# Patient Record
Sex: FEMALE | Race: WHITE | NOT HISPANIC OR LATINO | Employment: OTHER | URBAN - METROPOLITAN AREA
[De-identification: names, ages, dates, MRNs, and addresses within clinical notes are randomized per-mention and may not be internally consistent; named-entity substitution may affect disease eponyms.]

---

## 2021-09-22 ENCOUNTER — HOSPITAL ENCOUNTER (EMERGENCY)
Facility: HOSPITAL | Age: 21
Discharge: HOME | End: 2021-09-22
Attending: EMERGENCY MEDICINE | Admitting: EMERGENCY MEDICINE
Payer: COMMERCIAL

## 2021-09-22 VITALS
HEIGHT: 63 IN | SYSTOLIC BLOOD PRESSURE: 122 MMHG | WEIGHT: 128 LBS | HEART RATE: 106 BPM | DIASTOLIC BLOOD PRESSURE: 73 MMHG | RESPIRATION RATE: 18 BRPM | OXYGEN SATURATION: 100 % | TEMPERATURE: 98.2 F | BODY MASS INDEX: 22.68 KG/M2

## 2021-09-22 DIAGNOSIS — J21.0 RSV (ACUTE BRONCHIOLITIS DUE TO RESPIRATORY SYNCYTIAL VIRUS): Primary | ICD-10-CM

## 2021-09-22 DIAGNOSIS — Z34.91 FIRST TRIMESTER PREGNANCY: ICD-10-CM

## 2021-09-22 LAB
ALBUMIN SERPL-MCNC: 4.2 G/DL (ref 3.4–5)
ALP SERPL-CCNC: 48 IU/L (ref 35–126)
ALT SERPL-CCNC: 22 IU/L (ref 11–54)
ANION GAP SERPL CALC-SCNC: 12 MEQ/L (ref 3–15)
AST SERPL-CCNC: 21 IU/L (ref 15–41)
BASOPHILS # BLD: 0.02 K/UL (ref 0.01–0.1)
BASOPHILS NFR BLD: 0.3 %
BILIRUB SERPL-MCNC: 0.2 MG/DL (ref 0.3–1.2)
BUN SERPL-MCNC: 9 MG/DL (ref 8–20)
CALCIUM SERPL-MCNC: 9.8 MG/DL (ref 8.9–10.3)
CHLORIDE SERPL-SCNC: 104 MEQ/L (ref 98–109)
CO2 SERPL-SCNC: 18 MEQ/L (ref 22–32)
CREAT SERPL-MCNC: 0.6 MG/DL (ref 0.6–1.1)
D DIMER PPP IA.FEU-MCNC: 0.4 UG/ML FEU (ref 0–0.5)
DIFFERENTIAL METHOD BLD: NORMAL
EOSINOPHIL # BLD: 0.04 K/UL (ref 0.04–0.36)
EOSINOPHIL NFR BLD: 0.6 %
ERYTHROCYTE [DISTWIDTH] IN BLOOD BY AUTOMATED COUNT: 13.2 % (ref 11.7–14.4)
FLUAV RNA SPEC QL NAA+PROBE: NEGATIVE
FLUBV RNA SPEC QL NAA+PROBE: NEGATIVE
GFR SERPL CREATININE-BSD FRML MDRD: >60 ML/MIN/1.73M*2
GLUCOSE SERPL-MCNC: 91 MG/DL (ref 70–99)
HCG SERPL-ACNC: ABNORMAL IU/L (MIU/ML)
HCG UR QL: POSITIVE
HCT VFR BLDCO AUTO: 37.4 % (ref 35–45)
HGB BLD-MCNC: 12.3 G/DL (ref 11.8–15.7)
IMM GRANULOCYTES # BLD AUTO: 0.02 K/UL (ref 0–0.08)
IMM GRANULOCYTES NFR BLD AUTO: 0.3 %
LYMPHOCYTES # BLD: 1.52 K/UL (ref 1.2–3.5)
LYMPHOCYTES NFR BLD: 22.6 %
MCH RBC QN AUTO: 29.4 PG (ref 28–33.2)
MCHC RBC AUTO-ENTMCNC: 32.9 G/DL (ref 32.2–35.5)
MCV RBC AUTO: 89.3 FL (ref 83–98)
MONOCYTES # BLD: 0.42 K/UL (ref 0.28–0.8)
MONOCYTES NFR BLD: 6.2 %
NEUTROPHILS # BLD: 4.72 K/UL (ref 1.7–7)
NEUTS SEG NFR BLD: 70 %
NRBC BLD-RTO: 0 %
PDW BLD AUTO: 11 FL (ref 9.4–12.3)
PLATELET # BLD AUTO: 266 K/UL (ref 150–369)
POTASSIUM SERPL-SCNC: 3.4 MEQ/L (ref 3.6–5.1)
PROT SERPL-MCNC: 7.7 G/DL (ref 6–8.2)
RBC # BLD AUTO: 4.19 M/UL (ref 3.93–5.22)
RSV RNA SPEC QL NAA+PROBE: POSITIVE
SARS-COV-2 RNA RESP QL NAA+PROBE: NEGATIVE
SODIUM SERPL-SCNC: 134 MEQ/L (ref 136–144)
TROPONIN I SERPL-MCNC: <0.02 NG/ML
WBC # BLD AUTO: 6.74 K/UL (ref 3.8–10.5)

## 2021-09-22 PROCEDURE — 84703 CHORIONIC GONADOTROPIN ASSAY: CPT

## 2021-09-22 PROCEDURE — 25800000 HC PHARMACY IV SOLUTIONS: Performed by: EMERGENCY MEDICINE

## 2021-09-22 PROCEDURE — 93005 ELECTROCARDIOGRAM TRACING: CPT | Performed by: EMERGENCY MEDICINE

## 2021-09-22 PROCEDURE — 36415 COLL VENOUS BLD VENIPUNCTURE: CPT

## 2021-09-22 PROCEDURE — U0003 INFECTIOUS AGENT DETECTION BY NUCLEIC ACID (DNA OR RNA); SEVERE ACUTE RESPIRATORY SYNDROME CORONAVIRUS 2 (SARS-COV-2) (CORONAVIRUS DISEASE [COVID-19]), AMPLIFIED PROBE TECHNIQUE, MAKING USE OF HIGH THROUGHPUT TECHNOLOGIES AS DESCRIBED BY CMS-2020-01-R: HCPCS | Performed by: STUDENT IN AN ORGANIZED HEALTH CARE EDUCATION/TRAINING PROGRAM

## 2021-09-22 PROCEDURE — 84702 CHORIONIC GONADOTROPIN TEST: CPT | Performed by: EMERGENCY MEDICINE

## 2021-09-22 PROCEDURE — 84703 CHORIONIC GONADOTROPIN ASSAY: CPT | Performed by: EMERGENCY MEDICINE

## 2021-09-22 PROCEDURE — 96360 HYDRATION IV INFUSION INIT: CPT

## 2021-09-22 PROCEDURE — 80053 COMPREHEN METABOLIC PANEL: CPT

## 2021-09-22 PROCEDURE — 93005 ELECTROCARDIOGRAM TRACING: CPT

## 2021-09-22 PROCEDURE — 85379 FIBRIN DEGRADATION QUANT: CPT

## 2021-09-22 PROCEDURE — 85025 COMPLETE CBC W/AUTO DIFF WBC: CPT | Performed by: EMERGENCY MEDICINE

## 2021-09-22 PROCEDURE — 84484 ASSAY OF TROPONIN QUANT: CPT | Performed by: EMERGENCY MEDICINE

## 2021-09-22 PROCEDURE — 25000000 HC PHARMACY GENERAL: Performed by: EMERGENCY MEDICINE

## 2021-09-22 PROCEDURE — 87631 RESP VIRUS 3-5 TARGETS: CPT | Performed by: EMERGENCY MEDICINE

## 2021-09-22 PROCEDURE — 99284 EMERGENCY DEPT VISIT MOD MDM: CPT | Mod: 25

## 2021-09-22 PROCEDURE — 80053 COMPREHEN METABOLIC PANEL: CPT | Performed by: EMERGENCY MEDICINE

## 2021-09-22 PROCEDURE — 3E0337Z INTRODUCTION OF ELECTROLYTIC AND WATER BALANCE SUBSTANCE INTO PERIPHERAL VEIN, PERCUTANEOUS APPROACH: ICD-10-PCS | Performed by: EMERGENCY MEDICINE

## 2021-09-22 PROCEDURE — 85379 FIBRIN DEGRADATION QUANT: CPT | Performed by: EMERGENCY MEDICINE

## 2021-09-22 PROCEDURE — 85025 COMPLETE CBC W/AUTO DIFF WBC: CPT

## 2021-09-22 PROCEDURE — 84484 ASSAY OF TROPONIN QUANT: CPT

## 2021-09-22 RX ORDER — ALBUTEROL SULFATE 90 UG/1
2 INHALANT RESPIRATORY (INHALATION)
Status: ON HOLD | COMMUNITY
Start: 2021-04-21 | End: 2024-08-12 | Stop reason: HOSPADM

## 2021-09-22 RX ORDER — MONTELUKAST SODIUM 10 MG/1
10 TABLET ORAL NIGHTLY
COMMUNITY
Start: 2021-03-12 | End: 2023-10-30

## 2021-09-22 RX ORDER — CETIRIZINE HYDROCHLORIDE 10 MG/1
10 TABLET ORAL DAILY
COMMUNITY
End: 2023-10-30

## 2021-09-22 RX ORDER — IPRATROPIUM BROMIDE 21 UG/1
2 SPRAY, METERED NASAL
COMMUNITY
Start: 2021-09-08 | End: 2023-10-30

## 2021-09-22 RX ORDER — IPRATROPIUM BROMIDE AND ALBUTEROL SULFATE 2.5; .5 MG/3ML; MG/3ML
3 SOLUTION RESPIRATORY (INHALATION) ONCE
Status: COMPLETED | OUTPATIENT
Start: 2021-09-22 | End: 2021-09-22

## 2021-09-22 RX ADMIN — SODIUM CHLORIDE 1000 ML: 9 INJECTION, SOLUTION INTRAVENOUS at 21:06

## 2021-09-22 RX ADMIN — IPRATROPIUM BROMIDE AND ALBUTEROL SULFATE 3 ML: 2.5; .5 SOLUTION RESPIRATORY (INHALATION) at 21:13

## 2021-09-22 ASSESSMENT — ENCOUNTER SYMPTOMS
ARTHRALGIAS: 0
NAUSEA: 0
COUGH: 1
SINUS PRESSURE: 1
SORE THROAT: 0
CHILLS: 0
DYSURIA: 0
FEVER: 0
HEMATURIA: 0
VOMITING: 0
COLOR CHANGE: 0
BACK PAIN: 0
PALPITATIONS: 0
ABDOMINAL PAIN: 0
SHORTNESS OF BREATH: 1

## 2021-09-23 LAB
ATRIAL RATE: 117
P AXIS: 65
PR INTERVAL: 114
QRS DURATION: 64
QT INTERVAL: 312
QTC CALCULATION(BAZETT): 435
R AXIS: 74
T WAVE AXIS: 24
VENTRICULAR RATE: 117

## 2021-09-23 NOTE — ED ATTESTATION NOTE
9/22/202110:28 PM  I have personally seen and examined the patient.  I reviewed and agree with the PA/NP/Resident's assessment and plan of care.         Kane Nava, DO  09/22/21 2229

## 2021-09-23 NOTE — ED PROVIDER NOTES
Emergency Medicine Note  HPI   HISTORY OF PRESENT ILLNESS     21-year-old female with significant PMH of asthma who is 7 weeks pregnant presents to the ED today complaining of worsening cough and shortness of breath x1 week.  Patient reports that she initially thought that she was sick with some congestion and sinus discomfort.  Currently now has a dry cough.  Notes significant history of sinus problems (the with prior sinus surgery) that always seems to exacerbate her asthma.  Today she did her albuterol inhaler multiple times and 2 nebulizer treatments without relief of her shortness of breath and ultimately presented to the ED.  She is not vaccinated against COVID-19.  She denies chest pain or pleuritic discomfort.  Denies productive cough, fever/chills, abdominal pain, pelvic pain, vaginal bleeding, nausea vomiting, diarrhea or urinary symptoms.    She has a first appointment with [facility] at the end of the month.            Patient History   PAST HISTORY     Reviewed from Nursing Triage:      Past Medical History:   Diagnosis Date   • Asthma        Past Surgical History:   Procedure Laterality Date   • NOSE SURGERY         History reviewed. No pertinent family history.    Social History     Tobacco Use   • Smoking status: Never Smoker   • Smokeless tobacco: Never Used   Substance Use Topics   • Alcohol use: Never   • Drug use: Never         Review of Systems   REVIEW OF SYSTEMS     Review of Systems   Constitutional: Negative for chills and fever.   HENT: Positive for congestion and sinus pressure. Negative for ear pain and sore throat.    Respiratory: Positive for cough and shortness of breath.    Cardiovascular: Negative for chest pain and palpitations.   Gastrointestinal: Negative for abdominal pain, nausea and vomiting.   Genitourinary: Negative for dysuria, hematuria, pelvic pain and vaginal bleeding.   Musculoskeletal: Negative for arthralgias and back pain.   Skin: Negative for color change and rash.    All other systems reviewed and are negative.        VITALS     ED Vitals    Date/Time Temp Pulse Resp BP SpO2 [facility]   09/22/21 2226 36.8 °C (98.2 °F) 106 18 122/73 100 % KLM   09/22/21 2114 36.7 °C (98.1 °F) 102 20 136/81 98 % KLM   09/22/21 2009 -- 125 26 127/86 99 % MJB   09/22/21 1839 36.9 °C (98.4 °F) 138 32 153/81 100 % SJD        Pulse Ox %: 100 % (09/22/21 2300)  Pulse Ox Interpretation: Normal (09/22/21 2300)  Heart Rate: 117 (09/22/21 2300)  Rhythm Strip Interpretation: Sinus Tachycardia (09/22/21 2300)     Physical Exam   PHYSICAL EXAM     Physical Exam  Vitals and nursing note reviewed.   Constitutional:       General: She is not in acute distress.     Appearance: She is well-developed.      Comments: Well-appearing; NAD   HENT:      Head: Normocephalic and atraumatic.   Eyes:      Conjunctiva/sclera: Conjunctivae normal.   Cardiovascular:      Rate and Rhythm: Regular rhythm. Tachycardia present.      Heart sounds: No murmur heard.      Pulmonary:      Effort: Pulmonary effort is normal. No respiratory distress.      Breath sounds: Normal breath sounds.      Comments: Tachypneic  CTA bilaterally without wheezes, rales, rhonchi  SPO2 100% on RA  Abdominal:      Palpations: Abdomen is soft.      Tenderness: There is no abdominal tenderness.   Musculoskeletal:      Cervical back: Neck supple.   Skin:     General: Skin is warm and dry.   Neurological:      General: No focal deficit present.      Mental Status: She is alert and oriented to person, place, and time.           PROCEDURES     Procedures     DATA     Results     Procedure Component Value Units Date/Time    [facility] Draw Panel [[id number]] Collected: 09/22/21 1849    Specimen: Blood, Venous Updated: 09/23/21 0301    Narrative:      The following orders were created for panel order [facility] Draw Panel.  Procedure                               Abnormality         Status                     ---------                               -----------          ------                     RAINBOW RED[[id number]]                                      Final result                 Please view results for these tests on the individual orders.    RAINBOW RED [[id number]] Collected: 09/22/21 1849    Specimen: Blood, Venous Updated: 09/23/21 0301    BhCG, Serum, Quant [[id number]]  (Abnormal) Collected: 09/22/21 1849    Specimen: Blood, Venous Updated: 09/22/21 2216     hCG Quant 115,106.0 IU/L (mIU/mL)      Comment: Approx. Gestational Age   Approx. hCG Range         (Weeks)                  (IU/L)          0.2-1                    5-50            1-2                               2-3                  100-5000            3-4                  500-10,000            4-5                 1000-50,000            5-6               10,000-100,000            6-8               15,000-200,000            8-12              10,000-100,000       RSV and Influenza Nucleic Acids, PCR Nasopharynx [[id number]]  (Abnormal) Collected: 09/22/21 2010    Specimen: Nasopharyngeal Swab from Nasopharynx Updated: 09/22/21 2132     Influenza A Negative     Influenza B Negative     Respiratory Syncytial Virus Positive    SARS-CoV-2 (COVID-19), PCR Nasopharynx [[id number]]  (Normal) Collected: 09/22/21 2010    Specimen: Nasopharyngeal Swab from Nasopharynx Updated: 09/22/21 2102    Narrative:      The following orders were created for panel order SARS-CoV-2 (COVID-19), PCR Nasopharynx.  Procedure                               Abnormality         Status                     ---------                               -----------         ------                     SARS-CoV-2 (COVID-19), P...[[id number]]  Normal              Final result                 Please view results for these tests on the individual orders.    SARS-CoV-2 (COVID-19), PCR Nasopharynx [[id number]]  (Normal) Collected: 09/22/21 2010    Specimen: Nasopharyngeal Swab from Nasopharynx Updated: 09/22/21 2102     SARS-CoV-2 (COVID-19) Negative    Troponin  I [[id number]]  (Normal) Collected: 09/22/21 1849    Specimen: Blood, Venous Updated: 09/22/21 1927     Troponin I <0.02 ng/mL     D-DIMER [[id number]]  (Normal) Collected: 09/22/21 1849    Specimen: Blood, Venous Updated: 09/22/21 1924     D-Dimer, Quant 0.40 ug/mL FEU      Comment: The D-Dimer assay can be used as an aid in the diagnosis of DVT or PE. The test can not be used by itself to exclude DVT or PE. When used as a diagnostic aid, the cutoff value is the same as the reference range: <0.5 ug/ml FEU.       Comprehensive metabolic panel [[id number]]  (Abnormal) Collected: 09/22/21 1849    Specimen: Blood, Venous Updated: 09/22/21 1921     Sodium 134 mEQ/L      Potassium 3.4 mEQ/L      Comment: Results obtained on plasma. Plasma Potassium values may be up to 0.4 mEQ/L less than serum values. The differences may be greater for patients with high platelet or white cell counts.        Chloride 104 mEQ/L      CO2 18 mEQ/L      BUN 9 mg/dL      Creatinine 0.6 mg/dL      Glucose 91 mg/dL      Calcium 9.8 mg/dL      AST (SGOT) 21 IU/L      ALT (SGPT) 22 IU/L      Alkaline Phosphatase 48 IU/L      Total Protein 7.7 g/dL      Comment: Test performed on plasma which typically contains approximately 0.4 g/dL more protein than serum.        Albumin 4.2 g/dL      Bilirubin, Total 0.2 mg/dL      eGFR >60.0 mL/min/1.73m*2      Anion Gap 12 mEQ/L     hCG, serum, qualitative [[id number]]  (Abnormal) Collected: 09/22/21 1849    Specimen: Blood, Venous Updated: 09/22/21 1921     Preg Test, Serum Positive    Narrative:      CALLED TO [facility] BY NL AT 1920      CBC and differential [[id number]] Collected: 09/22/21 1849    Specimen: Blood, Venous Updated: 09/22/21 1907     WBC 6.74 K/uL      RBC 4.19 M/uL      Hemoglobin 12.3 g/dL      Hematocrit 37.4 %      MCV 89.3 fL      MCH 29.4 pg      MCHC 32.9 g/dL      RDW 13.2 %      Platelets 266 K/uL      MPV 11.0 fL      Differential Type Auto     nRBC 0.0 %      Immature Granulocytes  0.3 %      Neutrophils 70.0 %      Lymphocytes 22.6 %      Monocytes 6.2 %      Eosinophils 0.6 %      Basophils 0.3 %      Immature Granulocytes, Absolute 0.02 K/uL      Neutrophils, Absolute 4.72 K/uL      Lymphocytes, Absolute 1.52 K/uL      Monocytes, Absolute 0.42 K/uL      Eosinophils, Absolute 0.04 K/uL      Basophils, Absolute 0.02 K/uL           Imaging Results    None         ECG 12 lead   Final Result          Scoring tools                                 ED Course & MDM   MDM / ED COURSE and CLINICAL IMPRESSIONS     MDM    ED Course as of 09/25/21 0804   Wed Sep 22, 2021   2009 Impression: SOB x 1 week; sudden icrease today. Hx asthma attacks. Inhalers/breathing treatments at home without relief.  [BB]   2010 WBC: 6.74 [BB]   2010 Preg Test, Serum(!): Positive [BB]   2010 D-Dimer, Quant: 0.40 [BB]   2010 Troponin I: <0.02 [BB]   2010 Sodium(!): 134 [BB]   2023 RN aware of where patient is and IVF need [BB]   2024 Pt declining CXR. Discussed dimer, WBC, and so02 100% in detail with her and family bedside [BB]   2036 SARS-CoV-2 (COVID-19): Negative [BB]   2115 [facility] bedside to evaluate patient; in agreement with POC [BB]   2300 Respiratory Syncytial Virus(!!): Positive [BB]   2303 Patient feels significant improvement after DuoNeb and IV fluids.  Revitalized with improvement and decrease HR.  I discussed her diagnosis with her and symptomatic plan of care.  Discussed that it is still contagious and she should limit contact as viruses can last for 10 to 14 days and she verbalized understanding.  She feels much better after treatment here.  We will continue with her at home neb and inhalers.  Lots of fluids. No ABX needed at this time.  Discussed red flags return to ED following up with her PCP and she verbalized understanding with her family bedside. [BB]      ED Course User Index  [BB] Piedad Anderson PA C         Clinical Impressions as of 09/25/21 0804   RSV (acute bronchiolitis due to respiratory  syncytial virus)   First trimester pregnancy            Piedad Anderson PA C  09/25/21 0804

## 2021-09-23 NOTE — DISCHARGE INSTRUCTIONS
You were treated in the emergency department today for cough and upper respiratory infection.  Your blood work in the ER that was negative for things like a blood clot, any stress or injury to your heart, or pneumonia in your lungs that needed an antibiotic.    Your test for RSV; a common viral lung infection was positive.  This is a viral infection that typically requires symptomatic treatment of over-the-counter cough syrup, inhalers or breathing treatments as needed with her history of asthma, lots of fluids and symptomatic care.    Your test for Covid and the flu were negative.    Your oxygen is normal in the ER.  You can continue using your inhaler and breathing treatments at home as directed.  Hydrate with plenty of oral fluids and electrolyte enriched things such as Pedialyte and/or Gatorade.    Follow-up with your PCP 2 to 3 days to think about your visit to the ER today to discuss your feeling.    Follow-up with your OB/GYN for your first meal appointment as scheduled.    Return to the ER for any new or worsening symptoms.

## 2021-10-04 LAB
D AG BLD QL: POSITIVE
EXTERNAL ABO: NORMAL
EXTERNAL ANTIBODY SCREEN: NORMAL
HBV SURFACE AG SER QL: NONREACTIVE
HIV 1+2 AB+HIV1 P24 AG SERPL QL IA: NONREACTIVE
RUBELLA IGG SCREEN: NORMAL
T PALLIDUM AB SER QL IF: NONREACTIVE

## 2021-10-05 ENCOUNTER — TRANSCRIBE ORDERS (OUTPATIENT)
Dept: SCHEDULING | Age: 21
End: 2021-10-05
Payer: COMMERCIAL

## 2021-10-05 DIAGNOSIS — O35.10X0 MATERNAL CARE FOR (SUSPECTED) CHROMOSOMAL ABNORMALITY IN FETUS, NOT APPLICABLE OR UNSPECIFIED: ICD-10-CM

## 2021-10-05 DIAGNOSIS — O26.841 UTERINE SIZE-DATE DISCREPANCY, FIRST TRIMESTER: ICD-10-CM

## 2021-10-05 DIAGNOSIS — Z36.87 ENCOUNTER FOR ANTENATAL SCREENING FOR UNCERTAIN DATES: Primary | ICD-10-CM

## 2021-10-05 DIAGNOSIS — Z36.82 ENCOUNTER FOR ANTENATAL SCREENING FOR NUCHAL TRANSLUCENCY: ICD-10-CM

## 2021-10-05 DIAGNOSIS — Z36.9 ENCOUNTER FOR ANTENATAL SCREENING OF MOTHER: ICD-10-CM

## 2021-10-07 ENCOUNTER — TRANSCRIBE ORDERS (OUTPATIENT)
Dept: SCHEDULING | Age: 21
End: 2021-10-07
Payer: COMMERCIAL

## 2021-10-07 DIAGNOSIS — O36.80X0 PREGNANCY WITH INCONCLUSIVE FETAL VIABILITY, NOT APPLICABLE OR UNSPECIFIED: Primary | ICD-10-CM

## 2021-10-18 ENCOUNTER — OFFICE VISIT (OUTPATIENT)
Dept: FAMILY MEDICINE | Facility: CLINIC | Age: 21
End: 2021-10-18
Payer: COMMERCIAL

## 2021-10-18 VITALS
OXYGEN SATURATION: 99 % | TEMPERATURE: 94.4 F | BODY MASS INDEX: 23.32 KG/M2 | SYSTOLIC BLOOD PRESSURE: 116 MMHG | RESPIRATION RATE: 19 BRPM | WEIGHT: 131.6 LBS | DIASTOLIC BLOOD PRESSURE: 64 MMHG | HEIGHT: 63 IN | HEART RATE: 100 BPM

## 2021-10-18 DIAGNOSIS — Z00.00 ANNUAL PHYSICAL EXAM: Primary | ICD-10-CM

## 2021-10-18 DIAGNOSIS — Z23 FLU VACCINE NEED: ICD-10-CM

## 2021-10-18 DIAGNOSIS — Z34.01 PRIMIGRAVIDA IN FIRST TRIMESTER: ICD-10-CM

## 2021-10-18 DIAGNOSIS — J45.40 MODERATE PERSISTENT ASTHMA WITHOUT COMPLICATION: ICD-10-CM

## 2021-10-18 PROCEDURE — 99385 PREV VISIT NEW AGE 18-39: CPT | Mod: GC,25 | Performed by: FAMILY MEDICINE

## 2021-10-18 PROCEDURE — 90686 IIV4 VACC NO PRSV 0.5 ML IM: CPT | Performed by: FAMILY MEDICINE

## 2021-10-18 PROCEDURE — 90471 IMMUNIZATION ADMIN: CPT | Performed by: FAMILY MEDICINE

## 2021-10-18 RX ORDER — BUDESONIDE AND FORMOTEROL FUMARATE DIHYDRATE 160; 4.5 UG/1; UG/1
2 AEROSOL RESPIRATORY (INHALATION) 2 TIMES DAILY
COMMUNITY
End: 2023-10-30

## 2021-10-18 ASSESSMENT — PATIENT HEALTH QUESTIONNAIRE - PHQ9: SUM OF ALL RESPONSES TO PHQ9 QUESTIONS 1 & 2: 0

## 2021-10-18 NOTE — PROGRESS NOTES
Subjective      Patient ID: Miryam Irby is a 21 y.o. female here for the following:    Establish Care.  Patient with no current health complaints    Asthma  Follows with ENT, Dr. Ruddy Craig for allergies and asthma.   Experienced a temporary increase in symptoms after discontinuing dupixent when she became pregnant. Symptoms have returned to baseline, feels that asthma is well controlled.  Has not needed to use albuterol while exercising.    Hospitalized due to asthma as a child.  No recent hospitalizations.      Pregnancy  Dr. Bk Vázquez for OBGYN in [place].  Pregnancy has been uncomplicated.  Due in May. No morning sickness. Is taking a prenatal vitamin and Vitamin D 1000 mcg.    Has experienced increase in constipation since becoming pregnant.  Takes a probiotic.  Eats a lot of fruits and vegetables. Occasionally takes Miralax. Has bowel movement twice a week.  No blood in stool.      Recently completed nursing school.     No tobacco use  Alcohol use: Prior to pregnancy had several drinks a month. No current alcohol use  No other substance use.     She is interested in getting the flu vaccine today. Has not received covid vaccine.         Health Maintenance Questions  STI Concern No   Feel Safe at Home Yes, lives with boyfriend   Diet Breakfast: Eggs and fruit, Lunch: salad or avocado toast, Dinner:  chicken, salmon with veggies and a grain.  Eats out 2-3 x a week   Exercise Treadmill, spinning, body weights   # Days/Week Moderate-Greater Intensity 5   Minutes spent at that activity level 45-60   Seat Belts Yes   Sunscreen Yes   Smoke/CO Detectors Yes   Guns in the home No     Screenings  Colonoscopy/Fit n/a   Mammogram n/a   PAP 2020   DEXA n/a   Lung Ca Screening  (55-78 y/o, current smoker or quit < 15 years ago, > 30 pack/years) n/a   HCV (Born [birth year]) No results found for: HEPCAB    Dentist 07/2021, Every 6 months   Eye Exam Once a year, 06/2021. Wears glasses for driving  "      Influenza Due   Tdap 06/2020   Shingrix/Zostavax n/a   Pneumovax n/a   Prevnar n/a          PMH   Patient Active Problem List   Diagnosis   • Moderate persistent asthma without complication     Past Surgical History:   Procedure Laterality Date   • NOSE SURGERY         Current Outpatient Medications:   •  albuterol HFA (PROAIR HFA) 90 mcg/actuation inhaler, Inhale 2 puffs., Disp: , Rfl:   •  budesonide-formoteroL 160-4.5 mcg/actuation inhaler, Inhale 2 puffs 2 (two) times a day.  Rinse mouth with water after use to reduce aftertaste and incidence of candidiasis.  Do not swallow. For patients not on a ventilator, a spacer is recommended to be used with this medication/inhaler., Disp: , Rfl:   •  cetirizine (ZyrTEC) 10 mg tablet, Take 10 mg by mouth daily., Disp: , Rfl:   •  montelukast (SINGULAIR) 10 mg tablet, Take 10 mg by mouth nightly., Disp: , Rfl:   •  ipratropium (ATROVENT) 21 mcg (0.03 %) nasal spray, Administer 2 sprays into affected nostril(s)., Disp: , Rfl:   No Known Allergies  Family History   Problem Relation Age of Onset   • Asthma Biological Mother    • Hypertension Biological Mother    • Heart disease Maternal Grandfather    • Depression Paternal Grandmother    • Depression Paternal Grandfather           The following have been reviewed and updated as appropriate in this visit:         Review of Systems:  No fevers, chills, nausea/vomiting/diarrhea.  No abdominal pain.  No chest pain.  No shortness of breath.  No neurologic symptoms.  No focal weakness. No fatigue. No heat/cold intolerance.  Constipation.     Objective   Vitals:    10/18/21 1317   BP: 116/64   BP Location: Right upper arm   Patient Position: Sitting   Pulse: 100   Resp: 19   Temp: (!) 34.7 °C (94.4 °F)   TempSrc: Temporal   SpO2: 99%   Weight: 59.7 kg (131 lb 9.6 oz)   Height: 1.6 m (5' 3\")     Body mass index is 23.31 kg/m².    Physical Exam  Vitals reviewed.   Constitutional:       Appearance: Normal appearance.   HENT:      " Head: Normocephalic and atraumatic.      Right Ear: Tympanic membrane, ear canal and external ear normal.      Left Ear: Tympanic membrane, ear canal and external ear normal.   Eyes:      Conjunctiva/sclera: Conjunctivae normal.   Neck:      Thyroid: No thyroid mass, thyromegaly or thyroid tenderness.   Cardiovascular:      Rate and Rhythm: Normal rate and regular rhythm.      Heart sounds: Normal heart sounds. No murmur heard.  No friction rub. No gallop.    Pulmonary:      Effort: Pulmonary effort is normal.      Breath sounds: Normal breath sounds.   Abdominal:      General: Abdomen is flat. There is no distension.   Musculoskeletal:      Right lower leg: No edema.      Left lower leg: No edema.   Lymphadenopathy:      Cervical: No cervical adenopathy.   Skin:     General: Skin is warm and dry.   Neurological:      General: No focal deficit present.      Mental Status: She is alert and oriented to person, place, and time.      Gait: Gait normal.   Psychiatric:         Mood and Affect: Mood normal.         Behavior: Behavior normal.         Assessment/Plan   Diagnoses and all orders for this visit:    Annual physical exam (Primary)  Comments:  New patient to establish care.  Past, surgical, and family history reviewed. Medications reviewed.  Up to date on screenings. Counseled on benefits of covid vaccine.      Flu vaccine need  -     Influenza vaccine quadrivalent preservative free 6 mon and older IM (FluLaval)    Moderate persistent asthma without complication  Comments:  Patient follows with ENT.  Currently well controlled on current meds. Advised to follow up if asthma symptoms do not remain well controlled.      Primigravida in first trimester  Comments:  Follows with obgyn at [place]. Taking prenatal vitamins. Recommended to start magnesium for constipation that has worsened since pregnancy started.           Jacqueline Torres MD  10/18/21  3:27 PM

## 2021-10-18 NOTE — PROGRESS NOTES
I saw the patient and discussed the management with the Resident. I reviewed the Resident's note and agree with the documented findings and plan of care, except for my comments below or within the additional notes today.  New pt here for wellness exam. She is 11-12 weeks pregnant (seeing ObGyn for mgmt). Due for flu shot. Plans to get COVID19 vaccine later in pregnancy.  Pt has asthma and is taking Symbicort 2 puffs BID. Other screening UTD. Chronic constipation: MiraLax has worked in the past but pt does not like taking this regularly.  Exam: alert, vitals normal  Heart/lungs:med student heard mild wheezing. Resident did not  Agree with continuing asthma meds and Allergist/Asthma eval if any worsening symptoms.  Flu shot today  Trial of Magnesium 250-500mg for constipation. MiraLax prn

## 2021-10-18 NOTE — PATIENT INSTRUCTIONS
Drink plenty of water to improve constipation.  Use miralax as needed.  Start taking magnesium supplement every night.  You can start at 250 mg and increase as needed.      Plan on getting covid vaccine.    Patient Education   VACCINE INFORMATION STATEMENT    Influenza (Flu) Vaccine (Inactivated or Recombinant): What you need to know    Many vaccine information statements are available in [place] and other languages.   See www.immunize.org/vis    Hojas de información sobre vacunas están disponibles en español y en muchos otros   idiomas. Visite www.immunize.org/vis    1. Why get vaccinated?  Influenza vaccine can prevent influenza (flu).  Flu is a contagious disease that spreads around the [place] every year, usually between October and May. Anyone can get the flu, but it is more dangerous for some people. Infants and young children, people 65 years and older, pregnant people,   and people with certain health conditions or a weakened immune system are at greatest risk of flu complications.  Pneumonia, bronchitis, sinus infections, and ear infections are examples of flu-related complications. If you have a medical condition, such as heart disease, cancer, or diabetes, flu can make it worse.Flu can cause fever and chills, sore throat, muscle   aches, fatigue, cough, headache, and runny or stuffy nose. Some people may have vomiting and diarrhea, though this is more common in children than adults.In an average year, thousands of people in the [place] die from flu, and many more are   hospitalized. Flu vaccine prevents millions of illnesses and flu-related visits to the doctor each year.    2. Influenza vaccines  CDC recommends everyone 6 months and older get vaccinated every flu season. Children 6 months through 8 years of age may need 2 doses during a single flu season. Everyone else needs only 1 dose each flu season. It takes about 2 weeks for protection to develop after vaccination.There are many flu  viruses, and they are always changing. Each year a new flu vaccine is made to protect against the influenza viruses believed to be likely to cause disease in the upcoming flu season. Even when the vaccine doesn’t exactly match these viruses, it may still provide some protection.    Influenza vaccine does not cause flu.    Influenza vaccine may be given at the same time as other vaccines.    3. Talk with your health care provider  Tell your vaccination provider if the person getting the vaccine:  · Has had an allergic reaction after a previous dose of influenza vaccine, or has any severe, lifethreatening allergies  · Has ever had Guillain-Barré Syndrome (also called “GBS”)  In some cases, your health care provider may decide to postpone influenza vaccination until a future visit.  Influenza vaccine can be administered at any   time during pregnancy. People who are or will be   pregnant during influenza season should receive   inactivated influenza vaccine.    People with minor illnesses, such as a cold, may be vaccinated. People who are moderately or severely ill should usually wait until they recover before getting   influenza vaccine.  Your health care provider can give you more information.  4. Risks of a vaccine reaction  · Soreness, redness, and swelling where the shot is given, fever, muscle aches, and headache can happen after influenza vaccination.  · There may be a very small increased risk of Guillain-Barré Syndrome (GBS) after inactivated influenza vaccine (the flu shot).  Young children who get the flu shot along with pneumococcal vaccine (PCV13) and/or DTaP vaccine at the same time might be slightly more likely to have a seizure caused by fever. Tell your health care provider if a child who is getting flu vaccine has ever had a seizure.  People sometimes faint after medical procedures, including vaccination. Tell your provider if you feel dizzy or have vision changes or ringing in the ears.As with any  medicine, there is a very remote chance of a vaccine causing a severe allergic reaction, other serious injury, or death.    5. What if there is a serious problem?  An allergic reaction could occur after the vaccinated person leaves the clinic. If you see signs of a severe allergic reaction (hives, swelling of the face and throat, difficulty breathing, a fast heartbeat, dizziness, or weakness), call 9-1-1 and get the person   to the nearest hospital.  For other signs that concern you, call your health care provider.  Adverse reactions should be reported to the Vaccine Adverse Event Reporting System (VAERS). Your health care provider will usually file this report, or   you can do it yourself. Visit the VAERS website at www.vaers.hhs.gov or call 1-496.940.3325. VAERS is only for reporting reactions, and VAERS staff   members do not give medical advice.    6. The National Vaccine Injury Compensation Program  The National Vaccine Injury Compensation Program (VICP) is a federal program that was created to compensate people who may have been injured by certain vaccines. Claims regarding alleged injury or death due to vaccination have a time limit for filing,   which may be as short as two years. Visit the VICP website at www.hrsa.gov/vaccinecompensation or call 1-477.393.6892 to learn about the program and about filing a claim.    7. How can I learn more?  · ‚Ask your health care provider.  · Call your local or state health department.  · ‚Visit the website of the Food and Drug Administration (FDA) for vaccine package   inserts and additional information at www.fda.gov/vaccines-blood-biologics/vaccines.  · ‚Contact the Centers for Disease Control and Prevention (CDC):  -Call 1-234.318.2416 (7-299-AYO-INFO) or  -Visit CDC’s website at www.cdc.gov/flu    Vaccine Information Statement Inactivated Influenza Vaccine  42 U.S.C. § 300aa-26 8/6/2021

## 2021-10-19 ENCOUNTER — HOSPITAL ENCOUNTER (OUTPATIENT)
Dept: RADIOLOGY | Age: 21
Discharge: HOME | End: 2021-10-19
Attending: OBSTETRICS & GYNECOLOGY
Payer: COMMERCIAL

## 2021-10-19 DIAGNOSIS — O36.80X0 PREGNANCY WITH INCONCLUSIVE FETAL VIABILITY, NOT APPLICABLE OR UNSPECIFIED: ICD-10-CM

## 2021-10-19 PROCEDURE — 76801 OB US < 14 WKS SINGLE FETUS: CPT

## 2021-10-25 ENCOUNTER — HOSPITAL ENCOUNTER (OUTPATIENT)
Dept: PERINATAL CARE | Facility: HOSPITAL | Age: 21
Discharge: HOME | End: 2021-10-25
Attending: OBSTETRICS & GYNECOLOGY
Payer: COMMERCIAL

## 2021-10-25 ENCOUNTER — APPOINTMENT (OUTPATIENT)
Dept: LAB | Facility: HOSPITAL | Age: 21
End: 2021-10-25
Attending: OBSTETRICS & GYNECOLOGY
Payer: COMMERCIAL

## 2021-10-25 VITALS — BODY MASS INDEX: 23.04 KG/M2 | WEIGHT: 130 LBS | HEIGHT: 63 IN

## 2021-10-25 DIAGNOSIS — Z36.82 ENCOUNTER FOR ANTENATAL SCREENING FOR NUCHAL TRANSLUCENCY: Primary | ICD-10-CM

## 2021-10-25 DIAGNOSIS — Z3A.12 12 WEEKS GESTATION OF PREGNANCY: ICD-10-CM

## 2021-10-25 DIAGNOSIS — O26.841 UTERINE SIZE-DATE DISCREPANCY, FIRST TRIMESTER: ICD-10-CM

## 2021-10-25 DIAGNOSIS — Z36.3 ANTENATAL SCREENING FOR MALFORMATION USING ULTRASONICS: ICD-10-CM

## 2021-10-25 DIAGNOSIS — Z36.82 ENCOUNTER FOR ANTENATAL SCREENING FOR NUCHAL TRANSLUCENCY: ICD-10-CM

## 2021-10-25 PROCEDURE — 76813 OB US NUCHAL MEAS 1 GEST: CPT

## 2021-10-25 PROCEDURE — 30099997 SPECIMEN PROCESSING

## 2021-10-26 ENCOUNTER — TRANSCRIBE ORDERS (OUTPATIENT)
Dept: SCHEDULING | Age: 21
End: 2021-10-26
Payer: COMMERCIAL

## 2021-10-26 DIAGNOSIS — Z36.3 ENCOUNTER FOR ANTENATAL SCREENING FOR MALFORMATIONS: Primary | ICD-10-CM

## 2021-12-20 ENCOUNTER — HOSPITAL ENCOUNTER (OUTPATIENT)
Dept: PERINATAL CARE | Facility: HOSPITAL | Age: 21
Discharge: HOME | End: 2021-12-20
Attending: STUDENT IN AN ORGANIZED HEALTH CARE EDUCATION/TRAINING PROGRAM
Payer: COMMERCIAL

## 2021-12-20 DIAGNOSIS — O35.9XX0 SUSPECTED FETAL ABNORMALITY AFFECTING MANAGEMENT OF MOTHER, ANTEPARTUM, SINGLE OR UNSPECIFIED FETUS: Primary | ICD-10-CM

## 2021-12-20 DIAGNOSIS — Z3A.20 20 WEEKS GESTATION OF PREGNANCY: ICD-10-CM

## 2021-12-20 DIAGNOSIS — Z36.86 ENCOUNTER FOR SCREENING FOR RISK OF PRE-TERM LABOR: ICD-10-CM

## 2021-12-20 DIAGNOSIS — Z36.3 ENCOUNTER FOR ANTENATAL SCREENING FOR MALFORMATIONS: ICD-10-CM

## 2021-12-20 PROCEDURE — 76817 TRANSVAGINAL US OBSTETRIC: CPT

## 2022-02-15 ENCOUNTER — TELEPHONE (OUTPATIENT)
Dept: FAMILY MEDICINE | Facility: CLINIC | Age: 22
End: 2022-02-15
Payer: COMMERCIAL

## 2022-02-15 NOTE — TELEPHONE ENCOUNTER
I have never heard of this type of protocol, I think the patient should get further clarification form her benefits person.   Unnecessary telemedicine visit is probably to switch medication to a lower tier preferred medication, without even listening to the patient's breathing.

## 2022-02-15 NOTE — TELEPHONE ENCOUNTER
Incoming call on nurse line from patient.  Her insurance now has a new pharmacy, a medical specialty pharmacy, to dispense her symbicort.  This pharmacy is requesting that a copy of her last office visit with her medications be faxed to them at [phone].  She is required to meet with her insurance company doctor once so that the pharmacy change can occur.      I called patient's previous pharmacy, [facility], to be certain the information provided by the patient is correct in order to protect her PHI. They stated it is correct that patient can no longer obtain her symbicort through [facility].  She must now use [facility].  Spoke with patient once more who states that [facility].  I obtained [facility] phone number for [facility], [phone].  Spoke with pharmacy Blank peters, at [facility] who confirmed that patient must see [facility] specialty doctor via telemedicine to receive her symbicort which they consider a specialty medication.  It is correct that they would like her last visit notes faxed to [phone] so that she can be called to schedule a telemedicine appointment in order to continue taking her symbicort.    Notes from 10/18/2021 visit will be faxed to [phone] so that patient can continue to receive her medication.

## 2022-02-16 NOTE — TELEPHONE ENCOUNTER
Reviewed.  Will discuss with patient again today and advise Benefits clarification.    Detailed active my chart message sent to patient.

## 2022-04-16 LAB — GP B STREP SPEC QL CULT: NO GROWTH

## 2022-04-30 ENCOUNTER — ANESTHESIA (OUTPATIENT)
Dept: OBSTETRICS AND GYNECOLOGY | Facility: HOSPITAL | Age: 22
End: 2022-04-30
Payer: COMMERCIAL

## 2022-04-30 ENCOUNTER — HOSPITAL ENCOUNTER (INPATIENT)
Facility: HOSPITAL | Age: 22
LOS: 2 days | Discharge: HOME | End: 2022-05-02
Attending: OBSTETRICS & GYNECOLOGY | Admitting: OBSTETRICS & GYNECOLOGY
Payer: COMMERCIAL

## 2022-04-30 ENCOUNTER — ANESTHESIA EVENT (OUTPATIENT)
Dept: OBSTETRICS AND GYNECOLOGY | Facility: HOSPITAL | Age: 22
End: 2022-04-30
Payer: COMMERCIAL

## 2022-04-30 LAB
ABO + RH BLD: NORMAL
BLD GP AB SCN SERPL QL: NEGATIVE
D AG BLD QL: POSITIVE
ERYTHROCYTE [DISTWIDTH] IN BLOOD BY AUTOMATED COUNT: 13.9 % (ref 11.7–14.4)
HCT VFR BLDCO AUTO: 34.3 % (ref 35–45)
HGB BLD-MCNC: 11.4 G/DL (ref 11.8–15.7)
LABORATORY COMMENT REPORT: NORMAL
MCH RBC QN AUTO: 30.6 PG (ref 28–33.2)
MCHC RBC AUTO-ENTMCNC: 33.2 G/DL (ref 32.2–35.5)
MCV RBC AUTO: 92 FL (ref 83–98)
PDW BLD AUTO: 10.5 FL (ref 9.4–12.3)
PLATELET # BLD AUTO: 232 K/UL (ref 150–369)
RBC # BLD AUTO: 3.73 M/UL (ref 3.93–5.22)
SARS-COV-2 RNA RESP QL NAA+PROBE: NEGATIVE
SPECIMEN EXP DATE BLD: NORMAL
WBC # BLD AUTO: 8.6 K/UL (ref 3.8–10.5)

## 2022-04-30 PROCEDURE — 63700000 HC SELF-ADMINISTRABLE DRUG: Performed by: OBSTETRICS & GYNECOLOGY

## 2022-04-30 PROCEDURE — 86780 TREPONEMA PALLIDUM: CPT | Performed by: OBSTETRICS & GYNECOLOGY

## 2022-04-30 PROCEDURE — U0003 INFECTIOUS AGENT DETECTION BY NUCLEIC ACID (DNA OR RNA); SEVERE ACUTE RESPIRATORY SYNDROME CORONAVIRUS 2 (SARS-COV-2) (CORONAVIRUS DISEASE [COVID-19]), AMPLIFIED PROBE TECHNIQUE, MAKING USE OF HIGH THROUGHPUT TECHNOLOGIES AS DESCRIBED BY CMS-2020-01-R: HCPCS | Performed by: OBSTETRICS & GYNECOLOGY

## 2022-04-30 PROCEDURE — 37000005 HC ANESTHESIA EPIDURAL/SPINAL

## 2022-04-30 PROCEDURE — 25000000 HC PHARMACY GENERAL: Performed by: ANESTHESIOLOGY

## 2022-04-30 PROCEDURE — 86850 RBC ANTIBODY SCREEN: CPT

## 2022-04-30 PROCEDURE — 0UQMXZZ REPAIR VULVA, EXTERNAL APPROACH: ICD-10-PCS | Performed by: OBSTETRICS & GYNECOLOGY

## 2022-04-30 PROCEDURE — 0KQM0ZZ REPAIR PERINEUM MUSCLE, OPEN APPROACH: ICD-10-PCS | Performed by: OBSTETRICS & GYNECOLOGY

## 2022-04-30 PROCEDURE — 12000000 HC ROOM AND CARE MED/SURG

## 2022-04-30 PROCEDURE — 85027 COMPLETE CBC AUTOMATED: CPT | Performed by: OBSTETRICS & GYNECOLOGY

## 2022-04-30 PROCEDURE — 63600000 HC DRUGS/DETAIL CODE

## 2022-04-30 PROCEDURE — 36415 COLL VENOUS BLD VENIPUNCTURE: CPT | Performed by: OBSTETRICS & GYNECOLOGY

## 2022-04-30 PROCEDURE — 86900 BLOOD TYPING SEROLOGIC ABO: CPT

## 2022-04-30 PROCEDURE — 25800000 HC PHARMACY IV SOLUTIONS

## 2022-04-30 PROCEDURE — 63600000 HC DRUGS/DETAIL CODE: Performed by: OBSTETRICS & GYNECOLOGY

## 2022-04-30 PROCEDURE — 63600000 HC DRUGS/DETAIL CODE: Performed by: ANESTHESIOLOGY

## 2022-04-30 PROCEDURE — 27200130 HC EPIDURAL ANES TRAY

## 2022-04-30 PROCEDURE — 72000011 HC VAGINAL DELIVERY LEVEL 1

## 2022-04-30 PROCEDURE — 25800000 HC PHARMACY IV SOLUTIONS: Performed by: ANESTHESIOLOGY

## 2022-04-30 RX ORDER — AMOXICILLIN 250 MG
1 CAPSULE ORAL 2 TIMES DAILY
Status: DISCONTINUED | OUTPATIENT
Start: 2022-04-30 | End: 2022-05-02 | Stop reason: HOSPADM

## 2022-04-30 RX ORDER — SODIUM CHLORIDE, SODIUM LACTATE, POTASSIUM CHLORIDE, CALCIUM CHLORIDE 600; 310; 30; 20 MG/100ML; MG/100ML; MG/100ML; MG/100ML
INJECTION, SOLUTION INTRAVENOUS CONTINUOUS
Status: DISCONTINUED | OUTPATIENT
Start: 2022-04-30 | End: 2022-05-02 | Stop reason: HOSPADM

## 2022-04-30 RX ORDER — DIBUCAINE 1 %
1 OINTMENT (GRAM) TOPICAL AS NEEDED
Status: DISCONTINUED | OUTPATIENT
Start: 2022-04-30 | End: 2022-05-02 | Stop reason: HOSPADM

## 2022-04-30 RX ORDER — OXYCODONE HYDROCHLORIDE 5 MG/1
5 TABLET ORAL EVERY 4 HOURS PRN
Status: DISCONTINUED | OUTPATIENT
Start: 2022-04-30 | End: 2022-05-02 | Stop reason: HOSPADM

## 2022-04-30 RX ORDER — ALUMINUM HYDROXIDE, MAGNESIUM HYDROXIDE, AND SIMETHICONE 1200; 120; 1200 MG/30ML; MG/30ML; MG/30ML
30 SUSPENSION ORAL EVERY 4 HOURS PRN
Status: DISCONTINUED | OUTPATIENT
Start: 2022-04-30 | End: 2022-05-02 | Stop reason: HOSPADM

## 2022-04-30 RX ORDER — EPHEDRINE SULFATE/0.9% NACL/PF 50 MG/5 ML
SYRINGE (ML) INTRAVENOUS
Status: DISPENSED
Start: 2022-04-30 | End: 2022-04-30

## 2022-04-30 RX ORDER — FENTANYL/ROPIVACAINE/NS/PF 2-1500 MCG
PREFILLED PUMP RESERVOIR INJECTION CONTINUOUS
Status: DISCONTINUED | OUTPATIENT
Start: 2022-04-30 | End: 2022-05-02 | Stop reason: HOSPADM

## 2022-04-30 RX ORDER — EPHEDRINE SULFATE/0.9% NACL/PF 50 MG/5 ML
10 SYRINGE (ML) INTRAVENOUS ONCE
Status: ACTIVE | OUTPATIENT
Start: 2022-04-30 | End: 2022-04-30

## 2022-04-30 RX ORDER — IBUPROFEN 600 MG/1
600 TABLET ORAL EVERY 6 HOURS
Status: DISCONTINUED | OUTPATIENT
Start: 2022-04-30 | End: 2022-05-02 | Stop reason: HOSPADM

## 2022-04-30 RX ORDER — CALCIUM CARBONATE 200(500)MG
500 TABLET,CHEWABLE ORAL EVERY 4 HOURS PRN
Status: DISCONTINUED | OUTPATIENT
Start: 2022-04-30 | End: 2022-05-02 | Stop reason: HOSPADM

## 2022-04-30 RX ORDER — LIDOCAINE HYDROCHLORIDE AND EPINEPHRINE 15; 5 MG/ML; UG/ML
INJECTION, SOLUTION EPIDURAL
Status: COMPLETED | OUTPATIENT
Start: 2022-04-30 | End: 2022-04-30

## 2022-04-30 RX ORDER — FENTANYL/ROPIVACAINE/NS/PF 2-1500 MCG
PREFILLED PUMP RESERVOIR INJECTION
Status: COMPLETED
Start: 2022-04-30 | End: 2022-04-30

## 2022-04-30 RX ORDER — NYSTATIN 100000 [USP'U]/G
1 OINTMENT TOPICAL AS NEEDED
Status: DISCONTINUED | OUTPATIENT
Start: 2022-04-30 | End: 2022-05-02 | Stop reason: HOSPADM

## 2022-04-30 RX ORDER — ACETAMINOPHEN 325 MG/1
650 TABLET ORAL EVERY 4 HOURS PRN
Status: DISCONTINUED | OUTPATIENT
Start: 2022-04-30 | End: 2022-05-02 | Stop reason: HOSPADM

## 2022-04-30 RX ADMIN — PRENATAL VITAMINS-IRON FUMARATE 27 MG IRON-FOLIC ACID 0.8 MG TABLET 1 TABLET: at 17:57

## 2022-04-30 RX ADMIN — SODIUM CHLORIDE, POTASSIUM CHLORIDE, SODIUM LACTATE AND CALCIUM CHLORIDE: 600; 310; 30; 20 INJECTION, SOLUTION INTRAVENOUS at 10:13

## 2022-04-30 RX ADMIN — Medication: at 19:27

## 2022-04-30 RX ADMIN — SENNOSIDES AND DOCUSATE SODIUM 1 TABLET: 50; 8.6 TABLET ORAL at 19:23

## 2022-04-30 RX ADMIN — IBUPROFEN 600 MG: 600 TABLET ORAL at 17:57

## 2022-04-30 RX ADMIN — FENTANYL CITRATE 50 ML: 0.05 INJECTION, SOLUTION INTRAMUSCULAR; INTRAVENOUS at 13:08

## 2022-04-30 RX ADMIN — ACETAMINOPHEN 650 MG: 325 TABLET ORAL at 19:23

## 2022-04-30 RX ADMIN — SODIUM CHLORIDE, POTASSIUM CHLORIDE, SODIUM LACTATE AND CALCIUM CHLORIDE: 600; 310; 30; 20 INJECTION, SOLUTION INTRAVENOUS at 09:46

## 2022-04-30 RX ADMIN — FENTANYL CITRATE 50 ML: 0.05 INJECTION, SOLUTION INTRAMUSCULAR; INTRAVENOUS at 09:42

## 2022-04-30 RX ADMIN — LIDOCAINE HYDROCHLORIDE,EPINEPHRINE BITARTRATE 5 ML: 15; .005 INJECTION, SOLUTION EPIDURAL; INFILTRATION; INTRACAUDAL; PERINEURAL at 10:05

## 2022-04-30 ASSESSMENT — COGNITIVE AND FUNCTIONAL STATUS - GENERAL: DO YOU HAVE SERIOUS DIFFICULTY WALKING OR CLIMBING STAIRS: NO

## 2022-04-30 NOTE — H&P
HPI     Miryam Irby is a 21 y.o. female  at 39w0d with an estimated due date of 2022, by early Us presents with regular contractions in active labor. An epidural was placed.  The patient then progressed to complete and is now pushing making excellent progress.   Her pregnancy was uncomplicated.    Last PO intake:  Last Food Intake Date: 22, Last Food Intake Time: 2330  Last Fluid Intake Date: 22, Last Fluid Intake Time: 0945    OB History:   OB History    Para Term  AB Living   1 0 0 0 0 0   SAB IAB Ectopic Multiple Live Births   0 0 0 0 0      # Outcome Date GA Lbr Isreal/2nd Weight Sex Delivery Anes PTL Lv   1 Current                Medical History:   Past Medical History:   Diagnosis Date   • Asthma    • History of tonsillectomy        Surgical History:   Past Surgical History:   Procedure Laterality Date   • NOSE SURGERY     • SINUS SURGERY         Social History:   Social History     Socioeconomic History   • Marital status: Single     Spouse name: None   • Number of children: None   • Years of education: None   • Highest education level: None   Tobacco Use   • Smoking status: Never Smoker   • Smokeless tobacco: Never Used   Vaping Use   • Vaping Use: Never used   Substance and Sexual Activity   • Alcohol use: Never   • Drug use: Never   • Sexual activity: Yes     Partners: Male        Family History:   Family History   Problem Relation Age of Onset   • Asthma Biological Mother    • Hypertension Biological Mother    • Heart disease Maternal Grandfather    • Depression Paternal Grandmother    • Depression Paternal Grandfather        Allergies: Patient has no known allergies.    Prior to Admission medications    Medication Sig Start Date End Date Taking? Authorizing Provider   prenatal vit no.130-iron-folic 27 mg iron- 800 mcg tablet tablet Take 1 tablet by mouth daily.   Yes Provider, MD Anali   albuterol HFA (VENTOLIN HFA) 90 mcg/actuation inhaler Inhale 2 puffs.  4/21/21   Anali Arroyo MD   budesonide-formoteroL 160-4.5 mcg/actuation inhaler Inhale 2 puffs 2 (two) times a day.   Rinse mouth with water after use to reduce aftertaste and incidence of candidiasis.   Do not swallow.  For patients not on a ventilator, a spacer is recommended to be used with this medication/inhaler.    Anali Arroyo MD   cetirizine (ZyrTEC) 10 mg tablet Take 10 mg by mouth daily.    Anali Arroyo MD   ipratropium (ATROVENT) 21 mcg (0.03 %) nasal spray Administer 2 sprays into affected nostril(s). 9/8/21 10/8/21  Anali Arroyo MD   montelukast (SINGULAIR) 10 mg tablet Take 10 mg by mouth nightly. 3/12/21   Anali Arroyo MD       Review of Systems  Pertinent items are noted in HPI.    Objective     Vital Signs for the last 24 hours:  Temp:  [36.4 °C (97.6 °F)-36.9 °C (98.4 °F)] 36.7 °C (98.1 °F)  Heart Rate:  [] 67  Resp:  [16-18] 16  BP: ()/(56-87) 115/59    Latest cervical exam:  Cervical Dilation (cm): 10  Cervical Effacement: 100  Fetal Station: +2  Method: sterile exam per RN (04/30/22 1503)      Fetal Monitoring:  FHR Baseline: 120  FHR Variability: with pushing   FHR Accelerations: present   FHR Decelerations: variables    Contraction Frequency: every 1.5 minutes     Exam:  General Appearance: Alert, cooperative, no acute distress  Abdomen: gravid, nontender  Genitalia: See vaginal exam  Extremities: no edema or calf tenderness  Neurologic: grossly intact without focal deficits    Ultrasounds:   I have reviewed the applicable Ultrasounds.      Labs:  ABO   Date Value Ref Range Status   04/30/2022 A  Final     Rh Factor   Date Value Ref Range Status   04/30/2022 Positive  Final     Rubella IgG Scr   Date Value Ref Range Status   10/04/2021 Immune  Final     Strep Gp B Cult/DNA Probe   Date Value Ref Range Status   04/16/2022 No growth See table below, No growth at 18-24 hours, Probable contaminants, suggest recollection, No growth at 48  hours, No growth at 72 hours, No growth at 96 hours, No growth at 120 hours, No growth at 1 week, No growth at 2 weeks, No growth at 3 weeks, No gr... Final       Assessment/Plan     Miryam Irby is a 21 y.o. female  at 39w0d admitted for labor management.     FHR: Category I  GBS: negative    Mili Solitario DO

## 2022-04-30 NOTE — ANESTHESIA PREPROCEDURE EVALUATION
Anesthesia ROS/MED HX    Anesthesia History - neg  Pulmonary    asthma  Neuro/Psych - neg  Cardiovascular- neg  Hematological - neg  GI/Hepatic- neg  Musculoskeletal- neg  Renal Disease- neg  Endo/Other- neg      Relevant Problems   RESPIRATORY SYSTEM   (+) Moderate persistent asthma without complication       Physical Exam    Airway   Mallampati: I   TM distance: <3 FB   Neck ROM: full  Cardiovascular - normal   Rhythm: regular   Rate: normalPulmonary - normal   clear to auscultation  Other Findings   Back - neg   landmarks identified    Dental - normal        Anesthesia Plan    Plan: epidural    Technique: epidural     Lines and Monitors: PIV     Airway: natural airway / supplemental oxygen   ASA 2  Anesthetic plan and risks discussed with: patient  Postop Plan:   Patient Disposition: inpatient floor planned admission   Pain Management: IV analgesics

## 2022-04-30 NOTE — L&D DELIVERY NOTE
OB Vaginal Delivery Note    Delivery:2022 at 3:47 PM   Patient:Miryam Irby  :2000    Review the Delivery Report for details.     Delivery Details    Pre-Op Diagnosis: 1. 21 y.o.  intrauterine pregnancy at 39w0d with knutson gestation.  2. Labor    Post-Op Diagnosis: 1. Delivered viable male   Delivery Clinician: Mili Solitario    Delivery Assist;Delivery Nurse  Soni Kent;Yesica Clancy    Delivery Type: Vaginal, Spontaneous    Labor Complications: None    EBL: 250  mL   Anesthesia Type: Epidural    Placenta Delivery Spontaneous    Placenta Disposition: discarded    Additional Specimens None      INFANT INFORMATION  Time of Birth:3:47 PM   Presentation: Vertex   Position:Right ,Occiput ,Anterior   Cord: 3 vessels ,Complications:None   Henderson Sex: male   Henderson Weight:       1 Minute 5 Minute 10 Minute   Apgar Totals: 8   9          Information for the patient's :  [place] [[id number]]      Cord Gas     None           Delivery Details:    Miryam Irby is a 21 y.o.  at 39w0d gestation who presented to the hospital for PREGNANCY.  Her labor was spontaneous.  The patient progressed to fully dilated and pushed for  hours and   minutes.  A viable  male  infant was delivered by Vaginal, Spontaneous  from Right ,Occiput ,Anterior .  The anterior and posterior shoulders delivered without difficulty followed by the remainder of the infant. A spontaneous cry was heard, and the infant appeared to be moving all 4 extremities. The cord was clamped and cut with 3 vessels  noted.  The placenta delivered spontaneously shortly thereafter.  Fundal massage was performed and the fundus was found to be firm, and lochia was within normal limits. The perineum, vagina, cervix were inspected, and the following lacerations were noted:      Episiotomy: Median    Lacerations:   Perineal: 2nd  Repaired: Yes     Periurethral: right   Repaired: Yes    Labial:    Repaired:     Sulcus:   Repaired:     Vaginal:   Repaired:     Cervical:    Repaired:        Any lacerations were repaired in the usual fashion using Chromic;4-0 Synthetic Suture  suture. Excellent hemostasis was noted. At the completion of the case, sponge and needle counts were correct. The infant and patient were left in the delivery room in stable condition.     Attending Attestation: I was present and scrubbed for the entire procedure.    Mili Solitario DO

## 2022-04-30 NOTE — ANESTHESIA PROCEDURE NOTES
Epidural Block    Patient location during procedure: OB  Start time: 4/30/2022 9:54 AM  End time: 4/30/2022 10:05 AM  Reason for block: labor analgesia requested by patient and obstetrician  Staffing  Performed: anesthesiologist   Anesthesiologist: Steven Taylor MD  Preanesthetic Checklist  Completed: patient identified, surgical consent, pre-op evaluation, timeout performed, IV checked, risks and benefits discussed, monitors and equipment checked and sterile field maintained during procedure  Needle  Needle type: Tuohy   Needle gauge: 17 G  Needle length: 3.5 in  Catheter type: Single orifice  Catheter size: 19 G  Test dose: negative and lidocaine 1.5% with epinephrine 1-to-200,000  Assessment  Sensory level: T10  Additional Notes  Procedure well tolerated. Vital signs stable. No paresthesia.  Analgesia satisfactory. Patients nurse to notify anesthesiologist if hemodynamically unstable.    Medications Administered - 4/30/2022 10:05 AM   lidocaine 1.5%-EPINEPHrine 1:200,000 PF (XYLOCAINE W/EPI) injection - epidural   5 mL - 4/30/2022 10:05:00 AM

## 2022-04-30 NOTE — ANESTHESIOLOGIST PRE-PROCEDURE ATTESTATION
Pre-Procedure Patient Identification:  I am the Primary Anesthesiologist and have identified the patient on 04/30/22 at 10:29 AM.   I have confirmed the procedure(s) will be performed by the following surgeon/proceduralist * No surgeons listed *.

## 2022-05-01 PROBLEM — Z37.9 NORMAL LABOR: Status: ACTIVE | Noted: 2022-05-01

## 2022-05-01 LAB — T PALLIDUM AB SER QL IF: NONREACTIVE

## 2022-05-01 PROCEDURE — 63700000 HC SELF-ADMINISTRABLE DRUG: Performed by: OBSTETRICS & GYNECOLOGY

## 2022-05-01 PROCEDURE — 12000000 HC ROOM AND CARE MED/SURG

## 2022-05-01 RX ORDER — IBUPROFEN 600 MG/1
600 TABLET ORAL EVERY 6 HOURS
Qty: 40 TABLET | Refills: 0 | Status: SHIPPED | OUTPATIENT
Start: 2022-05-01 | End: 2023-10-30

## 2022-05-01 RX ORDER — ACETAMINOPHEN 325 MG/1
650 TABLET ORAL EVERY 4 HOURS PRN
Refills: 0
Start: 2022-05-01 | End: 2022-05-31

## 2022-05-01 RX ADMIN — IBUPROFEN 600 MG: 600 TABLET ORAL at 21:20

## 2022-05-01 RX ADMIN — SENNOSIDES AND DOCUSATE SODIUM 1 TABLET: 50; 8.6 TABLET ORAL at 08:55

## 2022-05-01 RX ADMIN — ACETAMINOPHEN 650 MG: 325 TABLET ORAL at 20:50

## 2022-05-01 RX ADMIN — SENNOSIDES AND DOCUSATE SODIUM 1 TABLET: 50; 8.6 TABLET ORAL at 20:49

## 2022-05-01 RX ADMIN — ACETAMINOPHEN 650 MG: 325 TABLET ORAL at 00:07

## 2022-05-01 RX ADMIN — MUPIROCIN 1 APPLICATION.: 20 OINTMENT TOPICAL at 16:02

## 2022-05-01 RX ADMIN — ACETAMINOPHEN 650 MG: 325 TABLET ORAL at 05:25

## 2022-05-01 RX ADMIN — OXYCODONE HYDROCHLORIDE 5 MG: 5 TABLET ORAL at 05:23

## 2022-05-01 RX ADMIN — IBUPROFEN 600 MG: 600 TABLET ORAL at 00:07

## 2022-05-01 RX ADMIN — IBUPROFEN 600 MG: 600 TABLET ORAL at 08:56

## 2022-05-01 RX ADMIN — PRENATAL VITAMINS-IRON FUMARATE 27 MG IRON-FOLIC ACID 0.8 MG TABLET 1 TABLET: at 08:56

## 2022-05-01 RX ADMIN — IBUPROFEN 600 MG: 600 TABLET ORAL at 15:47

## 2022-05-01 NOTE — DISCHARGE SUMMARY
Inpatient Discharge Summary    BRIEF OVERVIEW  Admitting Provider: Mili Solitario DO  Discharge Provider: Mili Solitario DO  Primary Care Physician at Discharge: Jacqueline Torres [phone]     Admission Date: 4/30/2022     Discharge Date: 5/2/2022    Primary Discharge Diagnosis  Normal labor    Secondary Discharge Diagnosis  none    Discharge Disposition  Home     Discharge Medications     Medication List      START taking these medications    acetaminophen 325 mg tablet  Commonly known as: TYLENOL  Take 2 tablets (650 mg total) by mouth every 4 (four) hours as needed for mild pain.  Dose: 650 mg     ibuprofen 600 mg tablet  Commonly known as: MOTRIN  Take 1 tablet (600 mg total) by mouth every 6 (six) hours for 10 days.  Dose: 600 mg        CONTINUE taking these medications    albuterol HFA 90 mcg/actuation inhaler  Commonly known as: VENTOLIN HFA  Inhale 2 puffs.  Dose: 2 puff     budesonide-formoteroL 160-4.5 mcg/actuation inhaler  Commonly known as: SYMBICORT  Inhale 2 puffs 2 (two) times a day.   Rinse mouth with water after use to reduce aftertaste and incidence of candidiasis.   Do not swallow.  For patients not on a ventilator, a spacer is recommended to be used with this medication/inhaler.  Dose: 2 puff     cetirizine 10 mg tablet  Commonly known as: ZyrTEC  Take 10 mg by mouth daily.  Dose: 10 mg     ipratropium 21 mcg (0.03 %) nasal spray  Commonly known as: ATROVENT  Administer 2 sprays into affected nostril(s).  Dose: 2 spray     montelukast 10 mg tablet  Commonly known as: SINGULAIR  Take 10 mg by mouth nightly.  Dose: 10 mg     prenatal vit no.130-iron-folic 27 mg iron- 800 mcg tablet tablet  Take 1 tablet by mouth daily.  Dose: 1 tablet            Active Issues Requiring Follow-up  Issue: none  Responsible Individual: self  What is Needed: 6 weeks  Follow-up Appointments Arranged: No     Outpatient Follow-Up  Encounter Information    This patient does not currently have any  appointments scheduled.         Test Results Pending at Discharge      DETAILS OF HOSPITAL STAY    Presenting Problem/History of Present Illness  Normal labor [O80, Z37.9]      Hospital Course/Operative Procedures Performed    Consults: none  Procedures: None  Pertinent Test Results:   CBC Results       04/30/22 09/22/21     0940 1849    WBC 8.60 6.74    RBC 3.73 4.19    HGB 11.4 12.3    HCT 34.3 37.4    MCV 92.0 89.3    MCH 30.6 29.4    MCHC 33.2 32.9     266

## 2022-05-01 NOTE — ANESTHESIA POSTPROCEDURE EVALUATION
Patient: Miryam Irby    Procedure Summary     Date: 04/30/22 Room / Location:     Anesthesia Start: 0953 Anesthesia Stop: 1547    Procedure: Labor Analgesia Diagnosis:     Scheduled Providers:  Responsible Provider: Steven Taylor MD    Anesthesia Type: epidural ASA Status: 2          Anesthesia Type: epidural  PACU Vitals    No data found in the last 10 encounters.           Anesthesia Post Evaluation    Pain management: adequate  Patient location during evaluation: floor  Patient participation: complete - patient participated  Level of consciousness: awake and alert  Cardiovascular status: acceptable  Airway Patency: adequate  Respiratory status: acceptable  Hydration status: acceptable  Pain well controlled after spinal preservative free morphine administration: Yes  Anesthetic complications: no

## 2022-05-01 NOTE — PLAN OF CARE
Problem: Adult Inpatient Plan of Care  Goal: Plan of Care Review  Outcome: Progressing  Flowsheets (Taken 5/1/2022 1651)  Progress: improving  Plan of Care Reviewed With:   patient   significant other  Outcome Summary: VSS. pain well-controlled w/ PO meds. increasingly independent at self and infant care. able to latch infant onto breast independently. social work consult pending prior to d/c to home tomorrow (for assistance w/ CHIP for infant).   Plan of Care Review  Plan of Care Reviewed With: patient, significant other  Progress: improving  Outcome Summary: VSS. pain well-controlled w/ PO meds. increasingly independent at self and infant care. able to latch infant onto breast independently. social work consult pending prior to d/c to home tomorrow (for assistance w/ CHIP for infant).

## 2022-05-01 NOTE — NURSING NOTE
PT up to bathroom and tape stuck to bed and pulled epidural tape. Tape removed and still waiting for anesthesia for caherter removal.     2230 catheter accidentally tugged by PT, called to the room and epidural catheter was backed out of PT. Black tip intact, anesthesia notified and assessed PT.

## 2022-05-01 NOTE — PROGRESS NOTES
Obstetrics Postpartum Progress Note    Events  No acute events overnight.    Subjective  Pain: no  Bleeding: lochia minimal  Diet: taking regular diet  Voiding: without difficulty  Ambulating: as tolerated    Vitals  Temp:  [36.4 °C (97.6 °F)-37.1 °C (98.8 °F)] 36.4 °C (97.6 °F)  Heart Rate:  [] 87  Resp:  [16-18] 16  BP: ()/() 116/72    I&O    Intake/Output Summary (Last 24 hours) at 2022 0928  Last data filed at 2022 1800  Gross per 24 hour   Intake 1034.62 ml   Output 800 ml   Net 234.62 ml       Physical Exam  General: well  Heart: Regular rate and rhythm  Lungs: Clear to auscultation bilaterally  Abdomen: soft, nondistended, non-tender  Fundus: firm and below umbilicus  Perineum: well approximated  Extremities: symmetric    Labs  Labs Reviewed:  Lab Results   Component Value Date    ABO A 2022    LABRH Positive 2022          Assessment/Plan   Problem-based Assessment and Plan    Miryammilla Irby is a 21 y.o.  postpartum day 1 s/p Vaginal, Spontaneous .    1. Vital Signs: stable  2. Hemodynamics: stable  3. Pain: controlled  4. Vaccinations/Rhogam: rhogam not indicated   5. Postpartum care: meeting all goals     Mili Solitario DO

## 2022-05-01 NOTE — PLAN OF CARE
Problem: Adjustment to Role Transition (Postpartum Vaginal Delivery)  Goal: Successful Maternal Role Transition  Outcome: Progressing     Problem: Bleeding (Postpartum Vaginal Delivery)  Goal: Hemostasis  Outcome: Progressing     Problem: Infection (Postpartum Vaginal Delivery)  Goal: Absence of Infection Signs and Symptoms  Outcome: Progressing     Problem: Pain (Postpartum Vaginal Delivery)  Goal: Acceptable Pain Control  Outcome: Progressing      PT is very active in infant care. Both PT and spouse eager to learn. Bleeding is light, ice to perineum, medication given, voids spontaneously. Resting in bed now.

## 2022-05-01 NOTE — LACTATION NOTE
P1. Assisted with two feedings. Baby latched well with active suckling and audible swallows. Reviewed supply/ demand, benefits of breastfeeding, home pumping tips, latch and positioning tips, feeding frequency, feeding cues,and signs of adequate intake. Lactation team will follow.

## 2022-05-01 NOTE — NURSING NOTE
Call placed to nurse anesthetist for epidural cath removal bc PT uncomfortable. She was in a case but would pass along to anesthesia for removal.

## 2022-05-02 VITALS
SYSTOLIC BLOOD PRESSURE: 111 MMHG | WEIGHT: 161 LBS | OXYGEN SATURATION: 98 % | DIASTOLIC BLOOD PRESSURE: 65 MMHG | HEIGHT: 63 IN | BODY MASS INDEX: 28.53 KG/M2 | HEART RATE: 81 BPM | RESPIRATION RATE: 18 BRPM | TEMPERATURE: 98.7 F

## 2022-05-02 PROCEDURE — 63700000 HC SELF-ADMINISTRABLE DRUG: Performed by: OBSTETRICS & GYNECOLOGY

## 2022-05-02 RX ADMIN — IBUPROFEN 600 MG: 600 TABLET ORAL at 04:44

## 2022-05-02 RX ADMIN — ACETAMINOPHEN 650 MG: 325 TABLET ORAL at 01:30

## 2022-05-02 RX ADMIN — PRENATAL VITAMINS-IRON FUMARATE 27 MG IRON-FOLIC ACID 0.8 MG TABLET 1 TABLET: at 08:42

## 2022-05-02 RX ADMIN — SENNOSIDES AND DOCUSATE SODIUM 1 TABLET: 50; 8.6 TABLET ORAL at 08:42

## 2022-05-02 NOTE — PLAN OF CARE
Problem: Adult Inpatient Plan of Care  Goal: Plan of Care Review  Outcome: Progressing  Flowsheets (Taken 5/1/2022 2340)  Progress: improving  Plan of Care Reviewed With:   patient   significant other  Outcome Summary: VSS, pain well controlled with Motrin/Tyl & occas Abbey, bf independently   Plan of Care Review  Plan of Care Reviewed With: patient, significant other  Progress: improving  Outcome Summary: VSS, pain well controlled with Motrin/Tyl & occas Abbey, bf independently

## 2022-05-02 NOTE — PROGRESS NOTES
5/2/2022  SW spoke to patient, patient states lives with FOB this is their 1st baby. Patient is expected to D/C home later today. Patient states that she is under her mother's insurance but baby needs insurance. SW explained that mother will have to apply for MA Shaina. SW informed that Financial Services will contact mother to complete an application over the phone. SW alerted Financial Services, Valeria Waddell to follow up. SW explained that MA application will take up to 30 days to be processed and approved by local [facility] assistance office.    SW also mentioned that baby will have a pediatrician appointment post discharge and that mother may want to mention to Pediatrician office that insurance is pending, mother may be responsible for co-pay. Mother understood, will contact pediatrician office, Flora Abdi.     Patient is breast feeding, has pump and supplies at home. Patient has supports, doing well very happy. No other needs. Brittnee ESCOBAR

## 2022-08-30 ENCOUNTER — HOSPITAL ENCOUNTER (OUTPATIENT)
Facility: CLINIC | Age: 22
Discharge: HOME | End: 2022-08-30
Attending: FAMILY MEDICINE
Payer: COMMERCIAL

## 2022-08-30 VITALS
DIASTOLIC BLOOD PRESSURE: 74 MMHG | OXYGEN SATURATION: 98 % | HEART RATE: 73 BPM | SYSTOLIC BLOOD PRESSURE: 122 MMHG | TEMPERATURE: 97.9 F

## 2022-08-30 DIAGNOSIS — L03.032 CELLULITIS OF GREAT TOE OF LEFT FOOT: ICD-10-CM

## 2022-08-30 DIAGNOSIS — L03.031 CELLULITIS OF GREAT TOE OF RIGHT FOOT: Primary | ICD-10-CM

## 2022-08-30 PROCEDURE — S9083 URGENT CARE CENTER GLOBAL: HCPCS | Performed by: NURSE PRACTITIONER

## 2022-08-30 PROCEDURE — 99213 OFFICE O/P EST LOW 20 MIN: CPT | Performed by: NURSE PRACTITIONER

## 2022-08-30 RX ORDER — MUPIROCIN 20 MG/G
OINTMENT TOPICAL 2 TIMES DAILY
Qty: 22 G | Refills: 0 | Status: SHIPPED | OUTPATIENT
Start: 2022-08-30 | End: 2022-09-06

## 2022-08-30 RX ORDER — DOXYCYCLINE 100 MG/1
100 CAPSULE ORAL 2 TIMES DAILY
Qty: 14 CAPSULE | Refills: 0 | Status: SHIPPED | OUTPATIENT
Start: 2022-08-30 | End: 2022-09-06

## 2022-08-30 ASSESSMENT — ENCOUNTER SYMPTOMS
CHILLS: 0
WOUND: 1
MYALGIAS: 0
COLOR CHANGE: 1
FATIGUE: 0
FEVER: 0

## 2022-08-30 NOTE — ED ATTESTATION NOTE
The patient was evaluated and managed by the nurse practitioner.  I was present in the office and provided direct supervision.  I have reviewed and agree with the diagnosis and treatment plan.       Susan Cottrell DO  08/30/22 1949

## 2022-08-30 NOTE — ED PROVIDER NOTES
"Emergency Medicine Note  HPI   HISTORY OF PRESENT ILLNESS     23 y/o female presents with concern for right great toe infection.   Got her toe nails done on Sunday night (2 days ago)  States that the technician was \"digging\" and clipping a lot of skin around her nails.  The following morning, had pain  Developed yellow drainage started today.  Cleansed with alcohol and applied Neosporin  Denies fevers, chills, myalgias,   NKDA  LMP 8/28/22 ( 2 days ago)  Just gave birth - not breast feeding              Patient History   PAST HISTORY     Reviewed from Nursing Triage:         Past Medical History:   Diagnosis Date   • Asthma    • History of tonsillectomy        Past Surgical History:   Procedure Laterality Date   • NOSE SURGERY     • SINUS SURGERY         Family History   Problem Relation Age of Onset   • Asthma Biological Mother    • Hypertension Biological Mother    • Heart disease Maternal Grandfather    • Depression Paternal Grandmother    • Depression Paternal Grandfather        Social History     Tobacco Use   • Smoking status: Never Smoker   • Smokeless tobacco: Never Used   Vaping Use   • Vaping Use: Never used   Substance Use Topics   • Alcohol use: Never   • Drug use: Never         Review of Systems   REVIEW OF SYSTEMS     Review of Systems   Constitutional: Negative for chills, fatigue and fever.   Musculoskeletal: Negative for myalgias.   Skin: Positive for color change and wound.         VITALS     ED Vitals    Date/Time Temp Pulse Resp BP SpO2 [facility]   08/30/22 1805 36.6 °C (97.9 °F) 73 -- 122/74 98 % KG                       Physical Exam   PHYSICAL EXAM     Physical Exam  Vitals reviewed.   Constitutional:       General: She is awake. She is not in acute distress.     Appearance: Normal appearance. She is well-developed and well-groomed. She is not ill-appearing, toxic-appearing or diaphoretic.   Cardiovascular:      Rate and Rhythm: Normal rate.   Pulmonary:      Effort: Pulmonary effort is normal. "   Feet:      Comments: Bilateral great toes with erythema and edema surrounding nail bilaterally (right > left).  Scant purulent drainage around nail on right foot  Tender to palpation.   Neurological:      Mental Status: She is alert.   Psychiatric:         Behavior: Behavior is cooperative.           PROCEDURES     Procedures     DATA     Results     None              No orders to display       Scoring tools                                  ED Course & MDM   MDM / ED COURSE / CLINICAL IMPRESSION / DISPO     MDM  Number of Diagnoses or Management Options  Cellulitis of great toe of left foot  Cellulitis of great toe of right foot  Diagnosis management comments:   21 y/o female presents with concern for right great toe infection after getting pedicure  Generally well-appearing in NAD. Vitals are normal. Bilateral great toes with erythema and edema surrounding nail bilaterally (right > left). Scant purulent drainage around nail on right foot. Tender to palpation. No expressible draingae  With treat for cellulitis with Mupirocin and Doxycycline. Warm soaks BID-TID  Strict return precautions given. Patient was given opportunity to ask questions. Patient verbalized understanding and agreeable with plan. See dispo for full care plan.           Clinical Impression      None     Disposition           Iesha Tucker CRNP  08/30/22 1926

## 2022-08-30 NOTE — DISCHARGE INSTRUCTIONS
Perform warm soaks 2-3 times per day.  Pat dry.  Apply topical antibiotic ointment after each soak.  Doxycycline twice per day for 7 days with a full meal and full glass of water to prevent stomach upset.  Do not lay flat for at least 1-2 hours after taking medication.   Medication can cause sensitivity to the sun, so wear sunscreen and protective clothing/hat while outdoors.     Avoid pedicures or picking at the skin around your nails.  Follow-up with your PCP if symptoms do not improve in 48-72 hours - sooner if symptoms worsen.     Thank you for choosing [facility]

## 2023-01-22 ENCOUNTER — HOSPITAL ENCOUNTER (OUTPATIENT)
Facility: CLINIC | Age: 23
Discharge: HOME | End: 2023-01-22
Attending: FAMILY MEDICINE
Payer: COMMERCIAL

## 2023-01-22 VITALS
DIASTOLIC BLOOD PRESSURE: 84 MMHG | HEART RATE: 77 BPM | OXYGEN SATURATION: 96 % | SYSTOLIC BLOOD PRESSURE: 124 MMHG | RESPIRATION RATE: 18 BRPM | HEIGHT: 63 IN | TEMPERATURE: 97.5 F | BODY MASS INDEX: 22.15 KG/M2 | WEIGHT: 125 LBS

## 2023-01-22 DIAGNOSIS — S91.209A NAIL AVULSION OF TOE, INITIAL ENCOUNTER: Primary | ICD-10-CM

## 2023-01-22 PROCEDURE — 99212 OFFICE O/P EST SF 10 MIN: CPT | Performed by: NURSE PRACTITIONER

## 2023-01-22 PROCEDURE — S9083 URGENT CARE CENTER GLOBAL: HCPCS | Performed by: NURSE PRACTITIONER

## 2023-01-22 ASSESSMENT — ENCOUNTER SYMPTOMS: COLOR CHANGE: 1

## 2023-01-22 NOTE — ED PROVIDER NOTES
Emergency Medicine Note  HPI   HISTORY OF PRESENT ILLNESS     Patient presents with recurrent issue with her right  big toe.  She was seen here for toe infection after getting her nails done.  She said ever since now she has issues with what she thinks is fungus in the nail and the nail is 3/4 of the way off.            Patient History   PAST HISTORY     Reviewed from Nursing Triage:       Past Medical History:   Diagnosis Date   • Asthma    • History of tonsillectomy        Past Surgical History:   Procedure Laterality Date   • NOSE SURGERY     • SINUS SURGERY         Family History   Problem Relation Age of Onset   • Asthma Biological Mother    • Hypertension Biological Mother    • Heart disease Maternal Grandfather    • Depression Paternal Grandmother    • Depression Paternal Grandfather        Social History     Tobacco Use   • Smoking status: Never   • Smokeless tobacco: Never   Vaping Use   • Vaping Use: Never used   Substance Use Topics   • Alcohol use: Never   • Drug use: Never         Review of Systems   REVIEW OF SYSTEMS     Review of Systems   Skin: Positive for color change.   All other systems reviewed and are negative.        VITALS     ED Vitals    Date/Time Temp Pulse Resp BP SpO2 [facility]   01/22/23 1349 36.4 °C (97.5 °F) 77 18 124/84 96 % JE                       Physical Exam   PHYSICAL EXAM     Physical Exam  Vitals and nursing note reviewed.   HENT:      Head: Normocephalic.   Cardiovascular:      Rate and Rhythm: Normal rate.   Pulmonary:      Effort: Pulmonary effort is normal.   Skin:     Findings: Erythema present.             Comments: Mild area of erythema on the lateral side of her big toe.  She denies any tenderness there.  She is got some discoloration at the cuticle of the nail and the nail is held on by the medial side only.   Neurological:      Mental Status: She is alert.           PROCEDURES     Procedures     DATA     Results     None              No orders to display       Scoring  tools                                  ED Course & MDM   MDM / ED COURSE / CLINICAL IMPRESSION / DISPO     Medical Decision Making  Referring patient to podiatry for the removal of the nail and possible treatment for fungus.  I secured a dressing over that nail to prevent it from getting caught on anything and causing more trauma.  She verbalized understanding and agreement with the plan.           Clinical Impression      Nail avulsion of toe, initial encounter     _________________     ED Disposition   Discharge                   Susan Munoz CRNP  01/22/23 1995

## 2023-01-22 NOTE — ED ATTESTATION NOTE
I was present in the urgent care during the visit for consultation.    I reviewed the nurse practitioner's history and physical exam.    I agree with the assessment and plan.       Rosa M Coles MD  01/22/23 3554

## 2023-01-22 NOTE — DISCHARGE INSTRUCTIONS
Recommend follow-up with podiatry to remove the rest of the nail and possibly treat for possible fungus infection.  Or medications typically are long-term and required liver tests.

## 2023-10-30 ENCOUNTER — OFFICE VISIT (OUTPATIENT)
Dept: FAMILY MEDICINE | Facility: CLINIC | Age: 23
End: 2023-10-30
Payer: COMMERCIAL

## 2023-10-30 VITALS
TEMPERATURE: 98.2 F | RESPIRATION RATE: 16 BRPM | SYSTOLIC BLOOD PRESSURE: 108 MMHG | BODY MASS INDEX: 22.15 KG/M2 | HEART RATE: 68 BPM | WEIGHT: 125 LBS | DIASTOLIC BLOOD PRESSURE: 74 MMHG | OXYGEN SATURATION: 98 % | HEIGHT: 63 IN

## 2023-10-30 DIAGNOSIS — Z00.00 ANNUAL PHYSICAL EXAM: ICD-10-CM

## 2023-10-30 DIAGNOSIS — J45.20 MILD INTERMITTENT ASTHMA WITHOUT COMPLICATION: ICD-10-CM

## 2023-10-30 PROCEDURE — 99395 PREV VISIT EST AGE 18-39: CPT | Mod: GC

## 2023-10-30 PROCEDURE — 3008F BODY MASS INDEX DOCD: CPT

## 2023-10-30 ASSESSMENT — PATIENT HEALTH QUESTIONNAIRE - PHQ9
SUM OF ALL RESPONSES TO PHQ9 QUESTIONS 1 & 2: 0
SUM OF ALL RESPONSES TO PHQ9 QUESTIONS 1 & 2: 0

## 2023-10-30 NOTE — PROGRESS NOTES
"I performed a history and physical examination of the patient and discussed the management with the Resident. I reviewed the Resident's note and agree with the documented findings and plan of care, except for my comments below or within the additional notes today.   height is 1.6 m (5' 3\") and weight is 56.7 kg (125 lb). Her temperature is 36.8 °C (98.2 °F). Her blood pressure is 108/74 and her pulse is 68. Her respiration is 16 and oxygen saturation is 98%.   Annual physical exam no complaints at this time patient moved out of her parents home and asthma has not improved dramatically.  Now has mild intermittent asthma,   And using albuterol intermittently.  Agree with resident's assessment and plan immunizations up-to-date except for Prevnar 20 however patient left before receiving this vaccine    "

## 2023-10-30 NOTE — ASSESSMENT & PLAN NOTE
23 y.o. female here for a wellness visit without any complaints.  Physical exam was unremarkable.     Reviewed the following with patient:   -Current issues and concerns.  -Screening and immunizations.  -Medical,surgical, family, and social history.  -Medications and allergies.     -Importance of diet, exercise, and lifestyle modification discussed with patient.  -CBC, BMP, TSH ordered to routine screening

## 2023-10-30 NOTE — PROGRESS NOTES
"     [facility] - Office Visit     [address] [place] [address]  [place] [address]  Tel: [phone]   Fax: [phone]     PATIENT IDENTIFICATION   Subjective      Patient ID: Miryam Irby is a 23 y.o. female here for the following:   Annual Exam     HISTORY OF PRESENT ILLNESS      HPI    Most recent physical in 2021 during pregnancy.    Eye exam - will see in November, sees q6 months  Dental - saw in July, sees q6 months  Derm - never, would appreciate a referral to see one, has a \"dot\" on R upper eyelid that she would like checked out    Exercise - walking and weight training, up to an hour/day, 6 days per week  Diet - organic, no added sugar, no seed oils, all home-cooked    Smoke/CO detectors at home - yes  Seatbelt in car - yes  Guns in home - no  Feel safe at home - yes  Sunscreen - daily    Concern for STI - no  1.5 year old baby at home, doing well.    Influenza vaccine - October 2023  COVID vaccine - had in spring 2023, declines new one for now  Pneumococcal vaccine - had in 2021    -Goes to [facility] ob[facility] - [place] Dr. Joaquina Anderson  Pap smear - 2 weeks ago, negative  GC and CT testing - 2 weeks ago, negative    OB  April 2022, G1Po  Not trying to conceive at this time, no birth control    Stopped taking Singulair, zyrtec after moving out of parent's house. Thinks dog was trigger. Has gotten allergy testing and was positive for dog.     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, has nursing degree  Marital status - engaged  Lives with baby and fiance  Smoking - never  Drug - never  Alcohol - socially   No major stressors currently    PHQ: 0   VAIBHAV: 3  Stress management: exercise, yoga, breath work, journaling, going on a drive alone    Past Medical History:   Diagnosis Date    Asthma     History of tonsillectomy      Past Surgical History:   Procedure Laterality Date    NOSE SURGERY      SINUS SURGERY       Family History   Problem Relation Age of Onset    Asthma Biological " "Mother     Hypertension Biological Mother     Heart disease Maternal Grandfather     Depression Paternal Grandmother     Depression Paternal Grandfather      Social History     Tobacco Use    Smoking status: Never    Smokeless tobacco: Never   Vaping Use    Vaping Use: Never used   Substance Use Topics    Alcohol use: Never    Drug use: Never     No Known Allergies    Current Outpatient Medications:     albuterol HFA (VENTOLIN HFA) 90 mcg/actuation inhaler, Inhale 2 puffs., Disp: , Rfl:     PAST MEDICAL AND SURGICAL HISTORY      Review of Systems  Per hpi     PHYSICAL EXAMINATION      Physical Exam  Vitals:    10/30/23 0853   BP: 108/74   Pulse: 68   Resp: 16   Temp: 36.8 °C (98.2 °F)   SpO2: 98%   Weight: 56.7 kg (125 lb)   Height: 1.6 m (5' 3\")      Body mass index is 22.14 kg/m².   Constitutional:       General: Alert, no acute distress, normal appearance.     Cardiovascular: Normal rate, regular rhythm, no murmurs. Normal S1, S2 heard.     Pulmonary:       CTA bilaterally, no respiratory distress or accessory muscle use.      No wheezing, rales, rhonchi, or stridor.      Abdominal: normal bowel sounds, non-tender, non-distended, no masses     ASSESSMENT AND PLAN         Assessment/Plan    Diagnoses and all orders for this visit:    Annual physical exam  Assessment & Plan:  23 y.o. female here for a wellness visit without any complaints.  Physical exam was unremarkable.     Reviewed the following with patient:   -Current issues and concerns.  -Screening and immunizations.  -Medical,surgical, family, and social history.  -Medications and allergies.     -Importance of diet, exercise, and lifestyle modification discussed with patient.  -CBC, BMP, TSH ordered to routine screening      Orders:  -     Ambulatory referral to Dermatology; Future  -     Basic metabolic panel; Future  -     CBC and differential; Future  -     TSH w reflex FT4; Future    Mild intermittent asthma without complication  Assessment & " Plan:  Continue albuterol prn  Continue avoiding triggers such as dog            Angelika Nguyen DO  PGY-2  Family Medicine Resident Physician  [facility]  10/30/2023

## 2023-12-04 ENCOUNTER — TELEPHONE (OUTPATIENT)
Dept: FAMILY MEDICINE | Facility: CLINIC | Age: 23
End: 2023-12-04

## 2023-12-04 ENCOUNTER — TELEPHONE (OUTPATIENT)
Dept: FAMILY MEDICINE | Facility: CLINIC | Age: 23
End: 2023-12-04
Payer: COMMERCIAL

## 2023-12-04 ENCOUNTER — APPOINTMENT (RX ONLY)
Dept: URBAN - METROPOLITAN AREA CLINIC 374 | Facility: CLINIC | Age: 23
Setting detail: DERMATOLOGY
End: 2023-12-04

## 2023-12-04 DIAGNOSIS — L81.4 OTHER MELANIN HYPERPIGMENTATION: ICD-10-CM

## 2023-12-04 DIAGNOSIS — L70.0 ACNE VULGARIS: ICD-10-CM

## 2023-12-04 DIAGNOSIS — L72.0 EPIDERMAL CYST: ICD-10-CM

## 2023-12-04 DIAGNOSIS — Z71.89 OTHER SPECIFIED COUNSELING: ICD-10-CM

## 2023-12-04 DIAGNOSIS — D18.0 HEMANGIOMA: ICD-10-CM

## 2023-12-04 DIAGNOSIS — D22 MELANOCYTIC NEVI: ICD-10-CM

## 2023-12-04 PROBLEM — D18.01 HEMANGIOMA OF SKIN AND SUBCUTANEOUS TISSUE: Status: ACTIVE | Noted: 2023-12-04

## 2023-12-04 PROBLEM — D22.5 MELANOCYTIC NEVI OF TRUNK: Status: ACTIVE | Noted: 2023-12-04

## 2023-12-04 PROBLEM — D23.71 OTHER BENIGN NEOPLASM OF SKIN OF RIGHT LOWER LIMB, INCLUDING HIP: Status: ACTIVE | Noted: 2023-12-04

## 2023-12-04 PROCEDURE — ? MEDICATION COUNSELING

## 2023-12-04 PROCEDURE — ? TREATMENT REGIMEN

## 2023-12-04 PROCEDURE — ? PRESCRIPTION

## 2023-12-04 PROCEDURE — 10040 EXTRACTION: CPT

## 2023-12-04 PROCEDURE — ? FULL BODY SKIN EXAM

## 2023-12-04 PROCEDURE — ? COUNSELING

## 2023-12-04 PROCEDURE — ? PRESCRIPTION MEDICATION MANAGEMENT

## 2023-12-04 PROCEDURE — ? TREATMENT GOALS

## 2023-12-04 PROCEDURE — ? ACNE SURGERY

## 2023-12-04 PROCEDURE — 99204 OFFICE O/P NEW MOD 45 MIN: CPT | Mod: 25

## 2023-12-04 RX ORDER — BENZOYL PEROXIDE 50 MG/ML
LIQUID TOPICAL QDAY
Qty: 237 | Refills: 5 | Status: ERX | COMMUNITY
Start: 2023-12-04

## 2023-12-04 RX ADMIN — BENZOYL PEROXIDE: 50 LIQUID TOPICAL at 00:00

## 2023-12-04 ASSESSMENT — LOCATION SIMPLE DESCRIPTION DERM
LOCATION SIMPLE: RIGHT FOREHEAD
LOCATION SIMPLE: ABDOMEN
LOCATION SIMPLE: LEFT CHEEK
LOCATION SIMPLE: RIGHT SHOULDER
LOCATION SIMPLE: RIGHT SUPERIOR EYELID
LOCATION SIMPLE: RIGHT UPPER BACK
LOCATION SIMPLE: CHEST

## 2023-12-04 ASSESSMENT — LOCATION DETAILED DESCRIPTION DERM
LOCATION DETAILED: LEFT CENTRAL MALAR CHEEK
LOCATION DETAILED: RIGHT SUPERIOR UPPER BACK
LOCATION DETAILED: RIGHT INFERIOR MEDIAL UPPER BACK
LOCATION DETAILED: EPIGASTRIC SKIN
LOCATION DETAILED: RIGHT MEDIAL SUPERIOR EYELID
LOCATION DETAILED: UPPER STERNUM
LOCATION DETAILED: RIGHT MEDIAL FOREHEAD
LOCATION DETAILED: RIGHT ANTERIOR SHOULDER

## 2023-12-04 ASSESSMENT — LOCATION ZONE DERM
LOCATION ZONE: TRUNK
LOCATION ZONE: ARM
LOCATION ZONE: EYELID
LOCATION ZONE: FACE

## 2023-12-04 NOTE — TELEPHONE ENCOUNTER
Pt at the dermatologist and had called last week and left messages asking PCP to send new referral as the one she sent pt to at end of October did not take her [employer] insurance.  Pt seeing Dr. Bobbi Reed at [facility] with NPI# [id number].  Routed to referral specialist to send referral.

## 2023-12-04 NOTE — PROCEDURE: MEDICATION COUNSELING
Cyclophosphamide Counseling:  I discussed with the patient the risks of cyclophosphamide including but not limited to hair loss, hormonal abnormalities, decreased fertility, abdominal pain, diarrhea, nausea and vomiting, bone marrow suppression and infection. The patient understands that monitoring is required while taking this medication.
Stelara Pregnancy And Lactation Text: This medication is Pregnancy Category B and is considered safe during pregnancy. It is unknown if this medication is excreted in breast milk.
Topical Ketoconazole Counseling: Patient counseled that this medication may cause skin irritation or allergic reactions.  In the event of skin irritation, the patient was advised to reduce the amount of the drug applied or use it less frequently.   The patient verbalized understanding of the proper use and possible adverse effects of ketoconazole.  All of the patient's questions and concerns were addressed.
Fluconazole Counseling:  Patient counseled regarding adverse effects of fluconazole including but not limited to headache, diarrhea, nausea, upset stomach, liver function test abnormalities, taste disturbance, and stomach pain.  There is a rare possibility of liver failure that can occur when taking fluconazole.  The patient understands that monitoring of LFTs and kidney function test may be required, especially at baseline. The patient verbalized understanding of the proper use and possible adverse effects of fluconazole.  All of the patient's questions and concerns were addressed.
Terbinafine Pregnancy And Lactation Text: This medication is Pregnancy Category B and is considered safe during pregnancy. It is also excreted in breast milk and breast feeding isn't recommended.
Otezla Pregnancy And Lactation Text: This medication is Pregnancy Category C and it isn't known if it is safe during pregnancy. It is unknown if it is excreted in breast milk.
Benzoyl Peroxide Counseling: Patient counseled that medicine may cause skin irritation and bleach clothing.  In the event of skin irritation, the patient was advised to reduce the amount of the drug applied or use it less frequently.   The patient verbalized understanding of the proper use and possible adverse effects of benzoyl peroxide.  All of the patient's questions and concerns were addressed.
Rituxan Counseling:  I discussed with the patient the risks of Rituxan infusions. Side effects can include infusion reactions, severe drug rashes including mucocutaneous reactions, reactivation of latent hepatitis and other infections and rarely progressive multifocal leukoencephalopathy.  All of the patient's questions and concerns were addressed.
Opioid Pregnancy And Lactation Text: These medications can lead to premature delivery and should be avoided during pregnancy. These medications are also present in breast milk in small amounts.
Albendazole Pregnancy And Lactation Text: This medication is Pregnancy Category C and it isn't known if it is safe during pregnancy. It is also excreted in breast milk.
Dapsone Pregnancy And Lactation Text: This medication is Pregnancy Category C and is not considered safe during pregnancy or breast feeding.
Niacinamide Counseling: I recommended taking niacin or niacinamide, also know as vitamin B3, twice daily. Recent evidence suggests that taking vitamin B3 (500 mg twice daily) can reduce the risk of actinic keratoses and non-melanoma skin cancers. Side effects of vitamin B3 include flushing and headache.
Wartpeel Pregnancy And Lactation Text: This medication is Pregnancy Category X and contraindicated in pregnancy and in women who may become pregnant. It is unknown if this medication is excreted in breast milk.
Drysol Pregnancy And Lactation Text: This medication is considered safe during pregnancy and breast feeding.
Erivedge Pregnancy And Lactation Text: This medication is Pregnancy Category X and is absolutely contraindicated during pregnancy. It is unknown if it is excreted in breast milk.
Sotyktu Pregnancy And Lactation Text: There is insufficient data to evaluate whether or not Sotyktu is safe to use during pregnancy.   It is not known if Sotyktu passes into breast milk and whether or not it is safe to use when breastfeeding.  
Cibinqo Counseling: I discussed with the patient the risks of Cibinqo therapy including but not limited to common cold, nausea, headache, cold sores, increased blood CPK levels, dizziness, UTIs, fatigue, acne, and vomitting. Live vaccines should be avoided.  This medication has been linked to serious infections; higher rate of mortality; malignancy and lymphoproliferative disorders; major adverse cardiovascular events; thrombosis; thrombocytopenia and lymphopenia; lipid elevations; and retinal detachment.
Clindamycin Pregnancy And Lactation Text: This medication can be used in pregnancy if certain situations. Clindamycin is also present in breast milk.
Klisyri Counseling:  I discussed with the patient the risks of Klisyri including but not limited to erythema, scaling, itching, weeping, crusting, and pain.
Quinolones Counseling:  I discussed with the patient the risks of fluoroquinolones including but not limited to GI upset, allergic reaction, drug rash, diarrhea, dizziness, photosensitivity, yeast infections, liver function test abnormalities, tendonitis/tendon rupture.
Picato Pregnancy And Lactation Text: This medication is Pregnancy Category C. It is unknown if this medication is excreted in breast milk.
Include Pregnancy/Lactation Warning?: No
Finasteride Pregnancy And Lactation Text: This medication is absolutely contraindicated during pregnancy. It is unknown if it is excreted in breast milk.
Dupixent Counseling: I discussed with the patient the risks of dupilumab including but not limited to eye inflammation and irritation, cold sores, injection site reactions, allergic reactions and increased risk of parasitic infection. The patient understands that monitoring is required and they must alert us or the primary physician if symptoms of infection or other concerning signs are noted.
Stelara Counseling:  I discussed with the patient the risks of ustekinumab including but not limited to immunosuppression, malignancy, posterior leukoencephalopathy syndrome, and serious infections.  The patient understands that monitoring is required including a PPD at baseline and must alert us or the primary physician if symptoms of infection or other concerning signs are noted.
Soolantra Counseling: I discussed with the patients the risks of topial Soolantra. This is a medicine which decreases the number of mites and inflammation in the skin. You experience burning, stinging, eye irritation or allergic reactions.  Please call our office if you develop any problems from using this medication.
Topical Ketoconazole Pregnancy And Lactation Text: This medication is Pregnancy Category B and is considered safe during pregnancy. It is unknown if it is excreted in breast milk.
Otezla Counseling: The side effects of Otezla were discussed with the patient, including but not limited to worsening or new depression, weight loss, diarrhea, nausea, upper respiratory tract infection, and headache. Patient instructed to call the office should any adverse effect occur.  The patient verbalized understanding of the proper use and possible adverse effects of Otezla.  All the patient's questions and concerns were addressed.
Cellcept Pregnancy And Lactation Text: This medication is Pregnancy Category D and isn't considered safe during pregnancy. It is unknown if this medication is excreted in breast milk.
Opioid Counseling: I discussed with the patient the potential side effects of opioids including but not limited to addiction, altered mental status, and depression. I stressed avoiding alcohol, benzodiazepines, muscle relaxants and sleep aids unless specifically okayed by a physician. The patient verbalized understanding of the proper use and possible adverse effects of opioids. All of the patient's questions and concerns were addressed. They were instructed to flush the remaining pills down the toilet if they did not need them for pain.
Fluconazole Pregnancy And Lactation Text: This medication is Pregnancy Category C and it isn't know if it is safe during pregnancy. It is also excreted in breast milk.
Low Dose Naltrexone Pregnancy And Lactation Text: Naltrexone is pregnancy category C.  There have been no adequate and well-controlled studies in pregnant women.  It should be used in pregnancy only if the potential benefit justifies the potential risk to the fetus.   Limited data indicates that naltrexone is minimally excreted into breastmilk.
Ivermectin Counseling:  Patient instructed to take medication on an empty stomach with a full glass of water.  Patient informed of potential adverse effects including but not limited to nausea, diarrhea, dizziness, itching, and swelling of the extremities or lymph nodes.  The patient verbalized understanding of the proper use and possible adverse effects of ivermectin.  All of the patient's questions and concerns were addressed.
Winlevi Counseling:  I discussed with the patient the risks of topical clascoterone including but not limited to erythema, scaling, itching, and stinging. Patient voiced their understanding.
Benzoyl Peroxide Pregnancy And Lactation Text: This medication is Pregnancy Category C. It is unknown if benzoyl peroxide is excreted in breast milk.
Erivedge Counseling- I discussed with the patient the risks of Erivedge including but not limited to nausea, vomiting, diarrhea, constipation, weight loss, changes in the sense of taste, decreased appetite, muscle spasms, and hair loss.  The patient verbalized understanding of the proper use and possible adverse effects of Erivedge.  All of the patient's questions and concerns were addressed.
Sotyktu Counseling:  I discussed the most common side effects of Sotyktu including: common cold, sore throat, sinus infections, cold sores, canker sores, folliculitis, and acne.  I also discussed more serious side effects of Sotyktu including but not limited to: serious allergic reactions; increased risk for infections such as TB; cancers such as lymphomas; rhabdomyolysis and elevated CPK; and elevated triglycerides and liver enzymes. 
Elidel Counseling: Patient may experience a mild burning sensation during topical application. Elidel is not approved in children less than 2 years of age. There have been case reports of hematologic and skin malignancies in patients using topical calcineurin inhibitors although causality is questionable.
Doxycycline Counseling:  Patient counseled regarding possible photosensitivity and increased risk for sunburn.  Patient instructed to avoid sunlight, if possible.  When exposed to sunlight, patients should wear protective clothing, sunglasses, and sunscreen.  The patient was instructed to call the office immediately if the following severe adverse effects occur:  hearing changes, easy bruising/bleeding, severe headache, or vision changes.  The patient verbalized understanding of the proper use and possible adverse effects of doxycycline.  All of the patient's questions and concerns were addressed.
Dapsone Counseling: I discussed with the patient the risks of dapsone including but not limited to hemolytic anemia, agranulocytosis, rashes, methemoglobinemia, kidney failure, peripheral neuropathy, headaches, GI upset, and liver toxicity.  Patients who start dapsone require monitoring including baseline LFTs and weekly CBCs for the first month, then every month thereafter.  The patient verbalized understanding of the proper use and possible adverse effects of dapsone.  All of the patient's questions and concerns were addressed.
Finasteride Male Counseling: Finasteride Counseling:  I discussed with the patient the risks of use of finasteride including but not limited to decreased libido, decreased ejaculate volume, gynecomastia, and depression. Women should not handle medication.  All of the patient's questions and concerns were addressed.
Klisyri Pregnancy And Lactation Text: It is unknown if this medication can harm a developing fetus or if it is excreted in breast milk.
Detail Level: Simple
Cimetidine Counseling:  I discussed with the patient the risks of Cimetidine including but not limited to gynecomastia, headache, diarrhea, nausea, drowsiness, arrhythmias, pancreatitis, skin rashes, psychosis, bone marrow suppression and kidney toxicity.
Dupixent Pregnancy And Lactation Text: This medication likely crosses the placenta but the risk for the fetus is uncertain. This medication is excreted in breast milk.
Protopic Counseling: Patient may experience a mild burning sensation during topical application. Protopic is not approved in children less than 2 years of age. There have been case reports of hematologic and skin malignancies in patients using topical calcineurin inhibitors although causality is questionable.
Soolantra Pregnancy And Lactation Text: This medication is Pregnancy Category C. This medication is considered safe during breast feeding.
Thalidomide Counseling: I discussed with the patient the risks of thalidomide including but not limited to birth defects, anxiety, weakness, chest pain, dizziness, cough and severe allergy.
Isotretinoin Counseling: Patient should get monthly blood tests, not donate blood, not drive at night if vision affected, not share medication, and not undergo elective surgery for 6 months after tx completed. Side effects reviewed, pt to contact office should one occur.
Prednisone Pregnancy And Lactation Text: This medication is Pregnancy Category C and it isn't know if it is safe during pregnancy. This medication is excreted in breast milk.
Cellcept Counseling:  I discussed with the patient the risks of mycophenolate mofetil including but not limited to infection/immunosuppression, GI upset, hypokalemia, hypercholesterolemia, bone marrow suppression, lymphoproliferative disorders, malignancy, GI ulceration/bleed/perforation, colitis, interstitial lung disease, kidney failure, progressive multifocal leukoencephalopathy, and birth defects.  The patient understands that monitoring is required including a baseline creatinine and regular CBC testing. In addition, patient must alert us immediately if symptoms of infection or other concerning signs are noted.
Griseofulvin Counseling:  I discussed with the patient the risks of griseofulvin including but not limited to photosensitivity, cytopenia, liver damage, nausea/vomiting and severe allergy.  The patient understands that this medication is best absorbed when taken with a fatty meal (e.g., ice cream or french fries).
Infliximab Counseling:  I discussed with the patient the risks of infliximab including but not limited to myelosuppression, immunosuppression, autoimmune hepatitis, demyelinating diseases, lymphoma, and serious infections.  The patient understands that monitoring is required including a PPD at baseline and must alert us or the primary physician if symptoms of infection or other concerning signs are noted.
Oral Minoxidil Pregnancy And Lactation Text: This medication should only be used when clearly needed if you are pregnant, attempting to become pregnant or breast feeding.
Topical Metronidazole Counseling: Metronidazole is a topical antibiotic medication. You may experience burning, stinging, redness, or allergic reactions.  Please call our office if you develop any problems from using this medication.
Skyrizi Pregnancy And Lactation Text: The risk during pregnancy and breastfeeding is uncertain with this medication.
Carac Counseling:  I discussed with the patient the risks of Carac including but not limited to erythema, scaling, itching, weeping, crusting, and pain.
Winlevi Pregnancy And Lactation Text: This medication is considered safe during pregnancy and breastfeeding.
Low Dose Naltrexone Counseling- I discussed with the patient the potential risks and side effects of low dose naltrexone including but not limited to: more vivid dreams, headaches, nausea, vomiting, abdominal pain, fatigue, dizziness, and anxiety.
Colchicine Pregnancy And Lactation Text: This medication is Pregnancy Category C and isn't considered safe during pregnancy. It is excreted in breast milk.
Minoxidil Counseling: Minoxidil is a topical medication which can increase blood flow where it is applied. It is uncertain how this medication increases hair growth. Side effects are uncommon and include stinging and allergic reactions.
Rinvoq Pregnancy And Lactation Text: Based on animal studies, Rinvoq may cause embryo-fetal harm when administered to pregnant women.  The medication should not be used in pregnancy.  Breastfeeding is not recommended during treatment and for 6 days after the last dose.
Doxycycline Pregnancy And Lactation Text: This medication is Pregnancy Category D and not consider safe during pregnancy. It is also excreted in breast milk but is considered safe for shorter treatment courses.
Rifampin Counseling: I discussed with the patient the risks of rifampin including but not limited to liver damage, kidney damage, red-orange body fluids, nausea/vomiting and severe allergy.
Protopic Pregnancy And Lactation Text: This medication is Pregnancy Category C. It is unknown if this medication is excreted in breast milk when applied topically.
Dutasteride Pregnancy And Lactation Text: This medication is absolutely contraindicated in women, especially during pregnancy and breast feeding. Feminization of male fetuses is possible if taking while pregnant.
Bexarotene Pregnancy And Lactation Text: This medication is Pregnancy Category X and should not be given to women who are pregnant or may become pregnant. This medication should not be used if you are breast feeding.
Enbrel Counseling:  I discussed with the patient the risks of etanercept including but not limited to myelosuppression, immunosuppression, autoimmune hepatitis, demyelinating diseases, lymphoma, and infections.  The patient understands that monitoring is required including a PPD at baseline and must alert us or the primary physician if symptoms of infection or other concerning signs are noted.
Topical Retinoid counseling:  Patient advised to apply a pea-sized amount only at bedtime and wait 30 minutes after washing their face before applying.  If too drying, patient may add a non-comedogenic moisturizer. The patient verbalized understanding of the proper use and possible adverse effects of retinoids.  All of the patient's questions and concerns were addressed.
Xolair Pregnancy And Lactation Text: This medication is Pregnancy Category B and is considered safe during pregnancy. This medication is excreted in breast milk.
Sski Pregnancy And Lactation Text: This medication is Pregnancy Category D and isn't considered safe during pregnancy. It is excreted in breast milk.
Griseofulvin Pregnancy And Lactation Text: This medication is Pregnancy Category X and is known to cause serious birth defects. It is unknown if this medication is excreted in breast milk but breast feeding should be avoided.
Oral Minoxidil Counseling- I discussed with the patient the risks of oral minoxidil including but not limited to shortness of breath, swelling of the feet or ankles, dizziness, lightheadedness, unwanted hair growth and allergic reaction.  The patient verbalized understanding of the proper use and possible adverse effects of oral minoxidil.  All of the patient's questions and concerns were addressed.
Skyrizi Counseling: I discussed with the patient the risks of risankizumab-rzaa including but not limited to immunosuppression, and serious infections.  The patient understands that monitoring is required including a PPD at baseline and must alert us or the primary physician if symptoms of infection or other concerning signs are noted.
Topical Metronidazole Pregnancy And Lactation Text: This medication is Pregnancy Category B and considered safe during pregnancy.  It is also considered safe to use while breastfeeding.
Prednisone Counseling:  I discussed with the patient the risks of prolonged use of prednisone including but not limited to weight gain, insomnia, osteoporosis, mood changes, diabetes, susceptibility to infection, glaucoma and high blood pressure.  In cases where prednisone use is prolonged, patients should be monitored with blood pressure checks, serum glucose levels and an eye exam.  Additionally, the patient may need to be placed on GI prophylaxis, PCP prophylaxis, and calcium and vitamin D supplementation and/or a bisphosphonate.  The patient verbalized understanding of the proper use and the possible adverse effects of prednisone.  All of the patient's questions and concerns were addressed.
Hydroxychloroquine Pregnancy And Lactation Text: This medication has been shown to cause fetal harm but it isn't assigned a Pregnancy Risk Category. There are small amounts excreted in breast milk.
Eucrisa Counseling: Patient may experience a mild burning sensation during topical application. Eucrisa is not approved in children less than 3 months of age.
Doxepin Counseling:  Patient advised that the medication is sedating and not to drive a car after taking this medication. Patient informed of potential adverse effects including but not limited to dry mouth, urinary retention, and blurry vision.  The patient verbalized understanding of the proper use and possible adverse effects of doxepin.  All of the patient's questions and concerns were addressed.
Colchicine Counseling:  Patient counseled regarding adverse effects including but not limited to stomach upset (nausea, vomiting, stomach pain, or diarrhea).  Patient instructed to limit alcohol consumption while taking this medication.  Colchicine may reduce blood counts especially with prolonged use.  The patient understands that monitoring of kidney function and blood counts may be required, especially at baseline. The patient verbalized understanding of the proper use and possible adverse effects of colchicine.  All of the patient's questions and concerns were addressed.
Rinvoq Counseling: I discussed with the patient the risks of Rinvoq therapy including but not limited to upper respiratory tract infections, shingles, cold sores, bronchitis, nausea, cough, fever, acne, and headache. Live vaccines should be avoided.  This medication has been linked to serious infections; higher rate of mortality; malignancy and lymphoproliferative disorders; major adverse cardiovascular events; thrombosis; thrombocytopenia, anemia, and neutropenia; lipid elevations; liver enzyme elevations; and gastrointestinal perforations.
Zyclara Counseling:  I discussed with the patient the risks of imiquimod including but not limited to erythema, scaling, itching, weeping, crusting, and pain.  Patient understands that the inflammatory response to imiquimod is variable from person to person and was educated regarded proper titration schedule.  If flu-like symptoms develop, patient knows to discontinue the medication and contact us.
Erythromycin Counseling:  I discussed with the patient the risks of erythromycin including but not limited to GI upset, allergic reaction, drug rash, diarrhea, increase in liver enzymes, and yeast infections.
Minoxidil Pregnancy And Lactation Text: This medication has not been assigned a Pregnancy Risk Category but animal studies failed to show danger with the topical medication. It is unknown if the medication is excreted in breast milk.
Rifampin Pregnancy And Lactation Text: This medication is Pregnancy Category C and it isn't know if it is safe during pregnancy. It is also excreted in breast milk and should not be used if you are breast feeding.
Qbrexza Counseling:  I discussed with the patient the risks of Qbrexza including but not limited to headache, mydriasis, blurred vision, dry eyes, nasal dryness, dry mouth, dry throat, dry skin, urinary hesitation, and constipation.  Local skin reactions including erythema, burning, stinging, and itching can also occur.
Dutasteride Male Counseling: Dustasteride Counseling:  I discussed with the patient the risks of use of dutasteride including but not limited to decreased libido, decreased ejaculate volume, and gynecomastia. Women who can become pregnant should not handle medication.  All of the patient's questions and concerns were addressed.
Adbry Counseling: I discussed with the patient the risks of tralokinumab including but not limited to eye infection and irritation, cold sores, injection site reactions, worsening of asthma, allergic reactions and increased risk of parasitic infection.  Live vaccines should be avoided while taking tralokinumab. The patient understands that monitoring is required and they must alert us or the primary physician if symptoms of infection or other concerning signs are noted.
Bexarotene Counseling:  I discussed with the patient the risks of bexarotene including but not limited to hair loss, dry lips/skin/eyes, liver abnormalities, hyperlipidemia, pancreatitis, depression/suicidal ideation, photosensitivity, drug rash/allergic reactions, hypothyroidism, anemia, leukopenia, infection, cataracts, and teratogenicity.  Patient understands that they will need regular blood tests to check lipid profile, liver function tests, white blood cell count, thyroid function tests and pregnancy test if applicable.
SSKI Counseling:  I discussed with the patient the risks of SSKI including but not limited to thyroid abnormalities, metallic taste, GI upset, fever, headache, acne, arthralgias, paraesthesias, lymphadenopathy, easy bleeding, arrhythmias, and allergic reaction.
Xolair Counseling:  Patient informed of potential adverse effects including but not limited to fever, muscle aches, rash and allergic reactions.  The patient verbalized understanding of the proper use and possible adverse effects of Xolair.  All of the patient's questions and concerns were addressed.
Methotrexate Pregnancy And Lactation Text: This medication is Pregnancy Category X and is known to cause fetal harm. This medication is excreted in breast milk.
Azathioprine Counseling:  I discussed with the patient the risks of azathioprine including but not limited to myelosuppression, immunosuppression, hepatotoxicity, lymphoma, and infections.  The patient understands that monitoring is required including baseline LFTs, Creatinine, possible TPMP genotyping and weekly CBCs for the first month and then every 2 weeks thereafter.  The patient verbalized understanding of the proper use and possible adverse effects of azathioprine.  All of the patient's questions and concerns were addressed.
Itraconazole Counseling:  I discussed with the patient the risks of itraconazole including but not limited to liver damage, nausea/vomiting, neuropathy, and severe allergy.  The patient understands that this medication is best absorbed when taken with acidic beverages such as non-diet cola or ginger ale.  The patient understands that monitoring is required including baseline LFTs and repeat LFTs at intervals.  The patient understands that they are to contact us or the primary physician if concerning signs are noted.
Topical Steroids Counseling: I discussed with the patient that prolonged use of topical steroids can result in the increased appearance of superficial blood vessels (telangiectasias), lightening (hypopigmentation) and thinning of the skin (atrophy).  Patient understands to avoid using high potency steroids in skin folds, the groin or the face.  The patient verbalized understanding of the proper use and possible adverse effects of topical steroids.  All of the patient's questions and concerns were addressed.
Olanzapine Pregnancy And Lactation Text: This medication is pregnancy category C.   There are no adequate and well controlled trials with olanzapine in pregnant females.  Olanzapine should be used during pregnancy only if the potential benefit justifies the potential risk to the fetus.   In a study in lactating healthy women, olanzapine was excreted in breast milk.  It is recommended that women taking olanzapine should not breast feed.
Calcipotriene Counseling:  I discussed with the patient the risks of calcipotriene including but not limited to erythema, scaling, itching, and irritation.
Hydroxychloroquine Counseling:  I discussed with the patient that a baseline ophthalmologic exam is needed at the start of therapy and every year thereafter while on therapy. A CBC may also be warranted for monitoring.  The side effects of this medication were discussed with the patient, including but not limited to agranulocytosis, aplastic anemia, seizures, rashes, retinopathy, and liver toxicity. Patient instructed to call the office should any adverse effect occur.  The patient verbalized understanding of the proper use and possible adverse effects of Plaquenil.  All the patient's questions and concerns were addressed.
Olumiant Pregnancy And Lactation Text: Based on animal studies, Olumiant may cause embryo-fetal harm when administered to pregnant women.  The medication should not be used in pregnancy.  Breastfeeding is not recommended during treatment.
Bactrim Counseling:  I discussed with the patient the risks of sulfa antibiotics including but not limited to GI upset, allergic reaction, drug rash, diarrhea, dizziness, photosensitivity, and yeast infections.  Rarely, more serious reactions can occur including but not limited to aplastic anemia, agranulocytosis, methemoglobinemia, blood dyscrasias, liver or kidney failure, lung infiltrates or desquamative/blistering drug rashes.
Erythromycin Pregnancy And Lactation Text: This medication is Pregnancy Category B and is considered safe during pregnancy. It is also excreted in breast milk.
Mirvaso Counseling: Mirvaso is a topical medication which can decrease superficial blood flow where applied. Side effects are uncommon and include stinging, redness and allergic reactions.
Sarecycline Counseling: Patient advised regarding possible photosensitivity and discoloration of the teeth, skin, lips, tongue and gums.  Patient instructed to avoid sunlight, if possible.  When exposed to sunlight, patients should wear protective clothing, sunglasses, and sunscreen.  The patient was instructed to call the office immediately if the following severe adverse effects occur:  hearing changes, easy bruising/bleeding, severe headache, or vision changes.  The patient verbalized understanding of the proper use and possible adverse effects of sarecycline.  All of the patient's questions and concerns were addressed.
Doxepin Pregnancy And Lactation Text: This medication is Pregnancy Category C and it isn't known if it is safe during pregnancy. It is also excreted in breast milk and breast feeding isn't recommended.
Calcipotriene Pregnancy And Lactation Text: The use of this medication during pregnancy or lactation is not recommended as there is insufficient data.
Zoryve Pregnancy And Lactation Text: It is unknown if this medication can cause problems during pregnancy and breastfeeding.
Humira Counseling:  I discussed with the patient the risks of adalimumab including but not limited to myelosuppression, immunosuppression, autoimmune hepatitis, demyelinating diseases, lymphoma, and serious infections.  The patient understands that monitoring is required including a PPD at baseline and must alert us or the primary physician if symptoms of infection or other concerning signs are noted.
Qbrexza Pregnancy And Lactation Text: There is no available data on Qbrexza use in pregnant women.  There is no available data on Qbrexza use in lactation.
Propranolol Pregnancy And Lactation Text: This medication is Pregnancy Category C and it isn't known if it is safe during pregnancy. It is excreted in breast milk.
Acitretin Pregnancy And Lactation Text: This medication is Pregnancy Category X and should not be given to women who are pregnant or may become pregnant in the future. This medication is excreted in breast milk.
Tazorac Counseling:  Patient advised that medication is irritating and drying.  Patient may need to apply sparingly and wash off after an hour before eventually leaving it on overnight.  The patient verbalized understanding of the proper use and possible adverse effects of tazorac.  All of the patient's questions and concerns were addressed.
Aklief counseling:  Patient advised to apply a pea-sized amount only at bedtime and wait 30 minutes after washing their face before applying.  If too drying, patient may add a non-comedogenic moisturizer.  The most commonly reported side effects including irritation, redness, scaling, dryness, stinging, burning, itching, and increased risk of sunburn.  The patient verbalized understanding of the proper use and possible adverse effects of retinoids.  All of the patient's questions and concerns were addressed.
Adbry Pregnancy And Lactation Text: It is unknown if this medication will adversely affect pregnancy or breast feeding.
Methotrexate Counseling:  Patient counseled regarding adverse effects of methotrexate including but not limited to nausea, vomiting, abnormalities in liver function tests. Patients may develop mouth sores, rash, diarrhea, and abnormalities in blood counts. The patient understands that monitoring is required including LFT's and blood counts.  There is a rare possibility of scarring of the liver and lung problems that can occur when taking methotrexate. Persistent nausea, loss of appetite, pale stools, dark urine, cough, and shortness of breath should be reported immediately. Patient advised to discontinue methotrexate treatment at least three months before attempting to become pregnant.  I discussed the need for folate supplements while taking methotrexate.  These supplements can decrease side effects during methotrexate treatment. The patient verbalized understanding of the proper use and possible adverse effects of methotrexate.  All of the patient's questions and concerns were addressed.
Olanzapine Counseling- I discussed with the patient the common side effects of olanzapine including but are not limited to: lack of energy, dry mouth, increased appetite, sleepiness, tremor, constipation, dizziness, changes in behavior, or restlessness.  Explained that teenagers are more likely to experience headaches, abdominal pain, pain in the arms or legs, tiredness, and sleepiness.  Serious side effects include but are not limited: increased risk of death in elderly patients who are confused, have memory loss, or dementia-related psychosis; hyperglycemia; increased cholesterol and triglycerides; and weight gain.
Topical Steroids Applications Pregnancy And Lactation Text: Most topical steroids are considered safe to use during pregnancy and lactation.  Any topical steroid applied to the breast or nipple should be washed off before breastfeeding.
Cantharidin Counseling:  I discussed with the patient the risks of Cantharidin including but not limited to pain, redness, burning, itching, and blistering.
Simponi Counseling:  I discussed with the patient the risks of golimumab including but not limited to myelosuppression, immunosuppression, autoimmune hepatitis, demyelinating diseases, lymphoma, and serious infections.  The patient understands that monitoring is required including a PPD at baseline and must alert us or the primary physician if symptoms of infection or other concerning signs are noted.
High Dose Vitamin A Pregnancy And Lactation Text: High dose vitamin A therapy is contraindicated during pregnancy and breast feeding.
Glycopyrrolate Pregnancy And Lactation Text: This medication is Pregnancy Category B and is considered safe during pregnancy. It is unknown if it is excreted breast milk.
Bactrim Pregnancy And Lactation Text: This medication is Pregnancy Category D and is known to cause fetal risk.  It is also excreted in breast milk.
Hydroquinone Counseling:  Patient advised that medication may result in skin irritation, lightening (hypopigmentation), dryness, and burning.  In the event of skin irritation, the patient was advised to reduce the amount of the drug applied or use it less frequently.  Rarely, spots that are treated with hydroquinone can become darker (pseudoochronosis).  Should this occur, patient instructed to stop medication and call the office. The patient verbalized understanding of the proper use and possible adverse effects of hydroquinone.  All of the patient's questions and concerns were addressed.
Metronidazole Counseling:  I discussed with the patient the risks of metronidazole including but not limited to seizures, nausea/vomiting, a metallic taste in the mouth, nausea/vomiting and severe allergy.
Clofazimine Counseling:  I discussed with the patient the risks of clofazimine including but not limited to skin and eye pigmentation, liver damage, nausea/vomiting, gastrointestinal bleeding and allergy.
Spironolactone Pregnancy And Lactation Text: This medication can cause feminization of the male fetus and should be avoided during pregnancy. The active metabolite is also found in breast milk.
Azithromycin Pregnancy And Lactation Text: This medication is considered safe during pregnancy and is also secreted in breast milk.
Sarecycline Pregnancy And Lactation Text: This medication is Pregnancy Category D and not consider safe during pregnancy. It is also excreted in breast milk.
Hydroxyzine Counseling: Patient advised that the medication is sedating and not to drive a car after taking this medication.  Patient informed of potential adverse effects including but not limited to dry mouth, urinary retention, and blurry vision.  The patient verbalized understanding of the proper use and possible adverse effects of hydroxyzine.  All of the patient's questions and concerns were addressed.
Olumiant Counseling: I discussed with the patient the risks of Olumiant therapy including but not limited to upper respiratory tract infections, shingles, cold sores, and nausea. Live vaccines should be avoided.  This medication has been linked to serious infections; higher rate of mortality; malignancy and lymphoproliferative disorders; major adverse cardiovascular events; thrombosis; gastrointestinal perforations; neutropenia; lymphopenia; anemia; liver enzyme elevations; and lipid elevations.
Mirvaso Pregnancy And Lactation Text: This medication has not been assigned a Pregnancy Risk Category. It is unknown if the medication is excreted in breast milk.
Rhofade Counseling: Rhofade is a topical medication which can decrease superficial blood flow where applied. Side effects are uncommon and include stinging, redness and allergic reactions.
Valtrex Pregnancy And Lactation Text: this medication is Pregnancy Category B and is considered safe during pregnancy. This medication is not directly found in breast milk but it's metabolite acyclovir is present.
Zoryve Counseling:  I discussed with the patient that Zoryve is not for use in the eyes, mouth or vagina. The most commonly reported side effects include diarrhea, headache, insomnia, application site pain, upper respiratory tract infections, and urinary tract infections.  All of the patient's questions and concerns were addressed.
Cimzia Counseling:  I discussed with the patient the risks of Cimzia including but not limited to immunosuppression, allergic reactions and infections.  The patient understands that monitoring is required including a PPD at baseline and must alert us or the primary physician if symptoms of infection or other concerning signs are noted.
Acitretin Counseling:  I discussed with the patient the risks of acitretin including but not limited to hair loss, dry lips/skin/eyes, liver damage, hyperlipidemia, depression/suicidal ideation, photosensitivity.  Serious rare side effects can include but are not limited to pancreatitis, pseudotumor cerebri, bony changes, clot formation/stroke/heart attack.  Patient understands that alcohol is contraindicated since it can result in liver toxicity and significantly prolong the elimination of the drug by many years.
Cantharidin Pregnancy And Lactation Text: This medication has not been proven safe during pregnancy. It is unknown if this medication is excreted in breast milk.
Tazorac Pregnancy And Lactation Text: This medication is not safe during pregnancy. It is unknown if this medication is excreted in breast milk.
Tremfya Counseling: I discussed with the patient the risks of guselkumab including but not limited to immunosuppression, serious infections, and drug reactions.  The patient understands that monitoring is required including a PPD at baseline and must alert us or the primary physician if symptoms of infection or other concerning signs are noted.
Ketoconazole Counseling:   Patient counseled regarding improving absorption with orange juice.  Adverse effects include but are not limited to breast enlargement, headache, diarrhea, nausea, upset stomach, liver function test abnormalities, taste disturbance, and stomach pain.  There is a rare possibility of liver failure that can occur when taking ketoconazole. The patient understands that monitoring of LFTs may be required, especially at baseline. The patient verbalized understanding of the proper use and possible adverse effects of ketoconazole.  All of the patient's questions and concerns were addressed.
Propranolol Counseling:  I discussed with the patient the risks of propranolol including but not limited to low heart rate, low blood pressure, low blood sugar, restlessness and increased cold sensitivity. They should call the office if they experience any of these side effects.
Nsaids Pregnancy And Lactation Text: These medications are considered safe up to 30 weeks gestation. It is excreted in breast milk.
Aklief Pregnancy And Lactation Text: It is unknown if this medication is safe to use during pregnancy.  It is unknown if this medication is excreted in breast milk.  Breastfeeding women should use the topical cream on the smallest area of the skin for the shortest time needed while breastfeeding.  Do not apply to nipple and areola.
Odomzo Counseling- I discussed with the patient the risks of Odomzo including but not limited to nausea, vomiting, diarrhea, constipation, weight loss, changes in the sense of taste, decreased appetite, muscle spasms, and hair loss.  The patient verbalized understanding of the proper use and possible adverse effects of Odomzo.  All of the patient's questions and concerns were addressed.
Topical Sulfur Applications Counseling: Topical Sulfur Counseling: Patient counseled that this medication may cause skin irritation or allergic reactions.  In the event of skin irritation, the patient was advised to reduce the amount of the drug applied or use it less frequently.   The patient verbalized understanding of the proper use and possible adverse effects of topical sulfur application.  All of the patient's questions and concerns were addressed.
5-Fu Counseling: 5-Fluorouracil Counseling:  I discussed with the patient the risks of 5-fluorouracil including but not limited to erythema, scaling, itching, weeping, crusting, and pain.
Glycopyrrolate Counseling:  I discussed with the patient the risks of glycopyrrolate including but not limited to skin rash, drowsiness, dry mouth, difficulty urinating, and blurred vision.
Azithromycin Counseling:  I discussed with the patient the risks of azithromycin including but not limited to GI upset, allergic reaction, drug rash, diarrhea, and yeast infections.
High Dose Vitamin A Counseling: Side effects reviewed, pt to contact office should one occur.
Litfulo Pregnancy And Lactation Text: Based on animal studies, Lifulo may cause embryo-fetal harm when administered to pregnant women.  The medication should not be used in pregnancy.  Breastfeeding is not recommended during treatment.
Cephalexin Counseling: I counseled the patient regarding use of cephalexin as an antibiotic for prophylactic and/or therapeutic purposes. Cephalexin (commonly prescribed under brand name Keflex) is a cephalosporin antibiotic which is active against numerous classes of bacteria, including most skin bacteria. Side effects may include nausea, diarrhea, gastrointestinal upset, rash, hives, yeast infections, and in rare cases, hepatitis, kidney disease, seizures, fever, confusion, neurologic symptoms, and others. Patients with severe allergies to penicillin medications are cautioned that there is about a 10% incidence of cross-reactivity with cephalosporins. When possible, patients with penicillin allergies should use alternatives to cephalosporins for antibiotic therapy.
Metronidazole Pregnancy And Lactation Text: This medication is Pregnancy Category B and considered safe during pregnancy.  It is also excreted in breast milk.
Tetracycline Counseling: Patient counseled regarding possible photosensitivity and increased risk for sunburn.  Patient instructed to avoid sunlight, if possible.  When exposed to sunlight, patients should wear protective clothing, sunglasses, and sunscreen.  The patient was instructed to call the office immediately if the following severe adverse effects occur:  hearing changes, easy bruising/bleeding, severe headache, or vision changes.  The patient verbalized understanding of the proper use and possible adverse effects of tetracycline.  All of the patient's questions and concerns were addressed. Patient understands to avoid pregnancy while on therapy due to potential birth defects.
Spironolactone Counseling: Patient advised regarding risks of diarrhea, abdominal pain, hyperkalemia, birth defects (for female patients), liver toxicity and renal toxicity. The patient may need blood work to monitor liver and kidney function and potassium levels while on therapy. The patient verbalized understanding of the proper use and possible adverse effects of spironolactone.  All of the patient's questions and concerns were addressed.
Opzelura Counseling:  I discussed with the patient the risks of Opzelura including but not limited to nasopharngitis, bronchitis, ear infection, eosinophila, hives, diarrhea, folliculitis, tonsillitis, and rhinorrhea.  Taken orally, this medication has been linked to serious infections; higher rate of mortality; malignancy and lymphoproliferative disorders; major adverse cardiovascular events; thrombosis; thrombocytopenia, anemia, and neutropenia; and lipid elevations.
Hydroxyzine Pregnancy And Lactation Text: This medication is not safe during pregnancy and should not be taken. It is also excreted in breast milk and breast feeding isn't recommended.
Cimzia Pregnancy And Lactation Text: This medication crosses the placenta but can be considered safe in certain situations. Cimzia may be excreted in breast milk.
Ilumya Counseling: I discussed with the patient the risks of tildrakizumab including but not limited to immunosuppression, malignancy, posterior leukoencephalopathy syndrome, and serious infections.  The patient understands that monitoring is required including a PPD at baseline and must alert us or the primary physician if symptoms of infection or other concerning signs are noted.
Valtrex Counseling: I discussed with the patient the risks of valacyclovir including but not limited to kidney damage, nausea, vomiting and severe allergy.  The patient understands that if the infection seems to be worsening or is not improving, they are to call.
Topical Clindamycin Counseling: Patient counseled that this medication may cause skin irritation or allergic reactions.  In the event of skin irritation, the patient was advised to reduce the amount of the drug applied or use it less frequently.   The patient verbalized understanding of the proper use and possible adverse effects of clindamycin.  All of the patient's questions and concerns were addressed.
Azelaic Acid Counseling: Patient counseled that medicine may cause skin irritation and to avoid applying near the eyes.  In the event of skin irritation, the patient was advised to reduce the amount of the drug applied or use it less frequently.   The patient verbalized understanding of the proper use and possible adverse effects of azelaic acid.  All of the patient's questions and concerns were addressed.
Cyclosporine Counseling:  I discussed with the patient the risks of cyclosporine including but not limited to hypertension, gingival hyperplasia,myelosuppression, immunosuppression, liver damage, kidney damage, neurotoxicity, lymphoma, and serious infections. The patient understands that monitoring is required including baseline blood pressure, CBC, CMP, lipid panel and uric acid, and then 1-2 times monthly CMP and blood pressure.
Siliq Counseling:  I discussed with the patient the risks of Siliq including but not limited to new or worsening depression, suicidal thoughts and behavior, immunosuppression, malignancy, posterior leukoencephalopathy syndrome, and serious infections.  The patient understands that monitoring is required including a PPD at baseline and must alert us or the primary physician if symptoms of infection or other concerning signs are noted. There is also a special program designed to monitor depression which is required with Siliq.
Topical Sulfur Applications Pregnancy And Lactation Text: This medication is considered safe during pregnancy and breast feeding secondary to limited systemic absorption.
Ketoconazole Pregnancy And Lactation Text: This medication is Pregnancy Category C and it isn't know if it is safe during pregnancy. It is also excreted in breast milk and breast feeding isn't recommended.
Nsaids Counseling: NSAID Counseling: I discussed with the patient that NSAIDs should be taken with food. Prolonged use of NSAIDs can result in the development of stomach ulcers.  Patient advised to stop taking NSAIDs if abdominal pain occurs.  The patient verbalized understanding of the proper use and possible adverse effects of NSAIDs.  All of the patient's questions and concerns were addressed.
Isotretinoin Pregnancy And Lactation Text: This medication is Pregnancy Category X and is considered extremely dangerous during pregnancy. It is unknown if it is excreted in breast milk.
Libtayo Pregnancy And Lactation Text: This medication is contraindicated in pregnancy and when breast feeding.
Xelnashz Pregnancy And Lactation Text: This medication is Pregnancy Category D and is not considered safe during pregnancy.  The risk during breast feeding is also uncertain.
Cephalexin Pregnancy And Lactation Text: This medication is Pregnancy Category B and considered safe during pregnancy.  It is also excreted in breast milk but can be used safely for shorter doses.
Litfulo Counseling: I discussed with the patient the risks of Litfulo therapy including but not limited to upper respiratory tract infections, shingles, cold sores, and nausea. Live vaccines should be avoided.  This medication has been linked to serious infections; higher rate of mortality; malignancy and lymphoproliferative disorders; major adverse cardiovascular events; thrombosis; gastrointestinal perforations; neutropenia; lymphopenia; anemia; liver enzyme elevations; and lipid elevations.
Imiquimod Counseling:  I discussed with the patient the risks of imiquimod including but not limited to erythema, scaling, itching, weeping, crusting, and pain.  Patient understands that the inflammatory response to imiquimod is variable from person to person and was educated regarded proper titration schedule.  If flu-like symptoms develop, patient knows to discontinue the medication and contact us.
Birth Control Pills Pregnancy And Lactation Text: This medication should be avoided if pregnant and for the first 30 days post-partum.
Opzelura Pregnancy And Lactation Text: There is insufficient data to evaluate drug-associated risk for major birth defects, miscarriage, or other adverse maternal or fetal outcomes.  There is a pregnancy registry that monitors pregnancy outcomes in pregnant persons exposed to the medication during pregnancy.  It is unknown if this medication is excreted in breast milk.  Do not breastfeed during treatment and for about 4 weeks after the last dose.
Minocycline Counseling: Patient advised regarding possible photosensitivity and discoloration of the teeth, skin, lips, tongue and gums.  Patient instructed to avoid sunlight, if possible.  When exposed to sunlight, patients should wear protective clothing, sunglasses, and sunscreen.  The patient was instructed to call the office immediately if the following severe adverse effects occur:  hearing changes, easy bruising/bleeding, severe headache, or vision changes.  The patient verbalized understanding of the proper use and possible adverse effects of minocycline.  All of the patient's questions and concerns were addressed.
Arava Counseling:  Patient counseled regarding adverse effects of Arava including but not limited to nausea, vomiting, abnormalities in liver function tests. Patients may develop mouth sores, rash, diarrhea, and abnormalities in blood counts. The patient understands that monitoring is required including LFTs and blood counts.  There is a rare possibility of scarring of the liver and lung problems that can occur when taking methotrexate. Persistent nausea, loss of appetite, pale stools, dark urine, cough, and shortness of breath should be reported immediately. Patient advised to discontinue Arava treatment and consult with a physician prior to attempting conception. The patient will have to undergo a treatment to eliminate Arava from the body prior to conception.
VTAMA Counseling: I discussed with the patient that VTAMA is not for use in the eyes, mouth or mouth. They should call the office if they develop any signs of allergic reactions to VTAMA. The patient verbalized understanding of the proper use and possible adverse effects of VTAMA.  All of the patient's questions and concerns were addressed.
Cosentyx Counseling:  I discussed with the patient the risks of Cosentyx including but not limited to worsening of Crohn's disease, immunosuppression, allergic reactions and infections.  The patient understands that monitoring is required including a PPD at baseline and must alert us or the primary physician if symptoms of infection or other concerning signs are noted.
Tranexamic Acid Pregnancy And Lactation Text: It is unknown if this medication is safe during pregnancy or breast feeding.
Solaraze Counseling:  I discussed with the patient the risks of Solaraze including but not limited to erythema, scaling, itching, weeping, crusting, and pain.
Cyclophosphamide Pregnancy And Lactation Text: This medication is Pregnancy Category D and it isn't considered safe during pregnancy. This medication is excreted in breast milk.
Terbinafine Counseling: Patient counseling regarding adverse effects of terbinafine including but not limited to headache, diarrhea, rash, upset stomach, liver function test abnormalities, itching, taste/smell disturbance, nausea, abdominal pain, and flatulence.  There is a rare possibility of liver failure that can occur when taking terbinafine.  The patient understands that a baseline LFT and kidney function test may be required. The patient verbalized understanding of the proper use and possible adverse effects of terbinafine.  All of the patient's questions and concerns were addressed.
Oxybutynin Counseling:  I discussed with the patient the risks of oxybutynin including but not limited to skin rash, drowsiness, dry mouth, difficulty urinating, and blurred vision.
Wartpeel Counseling:  I discussed with the patient the risks of Wartpeel including but not limited to erythema, scaling, itching, weeping, crusting, and pain.
Niacinamide Pregnancy And Lactation Text: These medications are considered safe during pregnancy.
Taltz Counseling: I discussed with the patient the risks of ixekizumab including but not limited to immunosuppression, serious infections, worsening of inflammatory bowel disease and drug reactions.  The patient understands that monitoring is required including a PPD at baseline and must alert us or the primary physician if symptoms of infection or other concerning signs are noted.
Albendazole Counseling:  I discussed with the patient the risks of albendazole including but not limited to cytopenia, kidney damage, nausea/vomiting and severe allergy.  The patient understands that this medication is being used in an off-label manner.
Rituxan Pregnancy And Lactation Text: This medication is Pregnancy Category C and it isn't know if it is safe during pregnancy. It is unknown if this medication is excreted in breast milk but similar antibodies are known to be excreted.
Libtayo Counseling- I discussed with the patient the risks of Libtayo including but not limited to nausea, vomiting, diarrhea, and bone or muscle pain.  The patient verbalized understanding of the proper use and possible adverse effects of Libtayo.  All of the patient's questions and concerns were addressed.
Gabapentin Counseling: I discussed with the patient the risks of gabapentin including but not limited to dizziness, somnolence, fatigue and ataxia.
Xeljanz Counseling: I discussed with the patient the risks of Xeljanz therapy including increased risk of infection, liver issues, headache, diarrhea, or cold symptoms. Live vaccines should be avoided. They were instructed to call if they have any problems.
Cibinqo Pregnancy And Lactation Text: It is unknown if this medication will adversely affect pregnancy or breast feeding.  You should not take this medication if you are currently pregnant or planning a pregnancy or while breastfeeding.
Drysol Counseling:  I discussed with the patient the risks of drysol/aluminum chloride including but not limited to skin rash, itching, irritation, burning.
Clindamycin Counseling: I counseled the patient regarding use of clindamycin as an antibiotic for prophylactic and/or therapeutic purposes. Clindamycin is active against numerous classes of bacteria, including skin bacteria. Side effects may include nausea, diarrhea, gastrointestinal upset, rash, hives, yeast infections, and in rare cases, colitis.
Birth Control Pills Counseling: Birth Control Pill Counseling: I discussed with the patient the potential side effects of OCPs including but not limited to increased risk of stroke, heart attack, thrombophlebitis, deep venous thrombosis, hepatic adenomas, breast changes, GI upset, headaches, and depression.  The patient verbalized understanding of the proper use and possible adverse effects of OCPs. All of the patient's questions and concerns were addressed.
Picato Counseling:  I discussed with the patient the risks of Picato including but not limited to erythema, scaling, itching, weeping, crusting, and pain.
Tranexamic Acid Counseling:  Patient advised of the small risk of bleeding problems with tranexamic acid. They were also instructed to call if they developed any nausea, vomiting or diarrhea. All of the patient's questions and concerns were addressed.
Solaraze Pregnancy And Lactation Text: This medication is Pregnancy Category B and is considered safe. There is some data to suggest avoiding during the third trimester. It is unknown if this medication is excreted in breast milk.

## 2023-12-04 NOTE — PROCEDURE: PRESCRIPTION MEDICATION MANAGEMENT
Render In Strict Bullet Format?: No
Initiate Treatment: benzoyl peroxide 5 % topical cleanser Qday: Use as body wash in the shower qHS. Lather and leave on for 5 minutes, then rinse off. May bleach clothing.
Detail Level: Zone

## 2023-12-06 NOTE — PROCEDURE: TREATMENT GOALS
Bill For Surgical Tray: no
Expected Date Of Service: 12/06/2023
Billing Type: Third-Party Bill
Performing Laboratory: [id number]
Treatment Goal Explanation (Does Not Render In The Note): Stable for the purposes of categorizing medical decision making is defined by the specific treatment goals for an individual patient. A patient that is not at their treatment goal is not stable, even if the condition has not changed and there is no short- term threat to life or function.
Treatment Goal Met?: No

## 2023-12-20 LAB
BASOPHILS # BLD AUTO: 0 X10E3/UL (ref 0–0.2)
BASOPHILS NFR BLD AUTO: 0 %
BUN SERPL-MCNC: 13 MG/DL (ref 6–20)
BUN/CREAT SERPL: 18 (ref 9–23)
CALCIUM SERPL-MCNC: 9.8 MG/DL (ref 8.7–10.2)
CHLORIDE SERPL-SCNC: 102 MMOL/L (ref 96–106)
CO2 SERPL-SCNC: 18 MMOL/L (ref 20–29)
CREAT SERPL-MCNC: 0.71 MG/DL (ref 0.57–1)
EGFRCR SERPLBLD CKD-EPI 2021: 122 ML/MIN/1.73
EOSINOPHIL # BLD AUTO: 0.3 X10E3/UL (ref 0–0.4)
EOSINOPHIL NFR BLD AUTO: 4 %
ERYTHROCYTE [DISTWIDTH] IN BLOOD BY AUTOMATED COUNT: 13 % (ref 11.7–15.4)
GLUCOSE SERPL-MCNC: 87 MG/DL (ref 70–99)
HCT VFR BLD AUTO: 36.9 % (ref 34–46.6)
HGB BLD-MCNC: 12.3 G/DL (ref 11.1–15.9)
IMM GRANULOCYTES # BLD AUTO: 0 X10E3/UL (ref 0–0.1)
IMM GRANULOCYTES NFR BLD AUTO: 0 %
LYMPHOCYTES # BLD AUTO: 1.6 X10E3/UL (ref 0.7–3.1)
LYMPHOCYTES NFR BLD AUTO: 26 %
MCH RBC QN AUTO: 30.6 PG (ref 26.6–33)
MCHC RBC AUTO-ENTMCNC: 33.3 G/DL (ref 31.5–35.7)
MCV RBC AUTO: 92 FL (ref 79–97)
MONOCYTES # BLD AUTO: 0.3 X10E3/UL (ref 0.1–0.9)
MONOCYTES NFR BLD AUTO: 6 %
NEUTROPHILS # BLD AUTO: 3.9 X10E3/UL (ref 1.4–7)
NEUTROPHILS NFR BLD AUTO: 64 %
PLATELET # BLD AUTO: 256 X10E3/UL (ref 150–450)
POTASSIUM SERPL-SCNC: 4.9 MMOL/L (ref 3.5–5.2)
RBC # BLD AUTO: 4.02 X10E6/UL (ref 3.77–5.28)
SODIUM SERPL-SCNC: 136 MMOL/L (ref 134–144)
T4 FREE SERPL-MCNC: 1.17 NG/DL (ref 0.82–1.77)
TSH SERPL DL<=0.005 MIU/L-ACNC: 2.38 UIU/ML (ref 0.45–4.5)
WBC # BLD AUTO: 6.1 X10E3/UL (ref 3.4–10.8)

## 2024-01-03 RX ORDER — BENZOYL PEROXIDE 50 MG/ML
LIQUID TOPICAL QDAY
Qty: 237 | Refills: 5 | Status: ERX

## 2024-01-22 ENCOUNTER — TRANSCRIBE ORDERS (OUTPATIENT)
Dept: SCHEDULING | Age: 24
End: 2024-01-22

## 2024-01-22 DIAGNOSIS — Z36.0 ENCOUNTER FOR ANTENATAL SCREENING FOR CHROMOSOMAL ANOMALIES: Primary | ICD-10-CM

## 2024-01-24 ENCOUNTER — HOSPITAL ENCOUNTER (OUTPATIENT)
Dept: PERINATAL CARE | Facility: HOSPITAL | Age: 24
Discharge: HOME | End: 2024-01-24
Attending: NURSE PRACTITIONER
Payer: COMMERCIAL

## 2024-01-24 DIAGNOSIS — Z3A.10 10 WEEKS GESTATION OF PREGNANCY: ICD-10-CM

## 2024-01-24 DIAGNOSIS — O36.80X0 ENCOUNTER TO DETERMINE FETAL VIABILITY OF PREGNANCY, SINGLE OR UNSPECIFIED FETUS: Primary | ICD-10-CM

## 2024-01-24 LAB
HBV SURFACE AG SER QL: NONREACTIVE
HIV 1+2 AB+HIV1 P24 AG SERPL QL IA: NONREACTIVE
T PALLIDUM AB SER QL IF: NONREACTIVE

## 2024-01-24 PROCEDURE — 76801 OB US < 14 WKS SINGLE FETUS: CPT

## 2024-02-12 ENCOUNTER — APPOINTMENT (OUTPATIENT)
Dept: LAB | Facility: HOSPITAL | Age: 24
End: 2024-02-12
Attending: NURSE PRACTITIONER
Payer: COMMERCIAL

## 2024-02-12 ENCOUNTER — HOSPITAL ENCOUNTER (OUTPATIENT)
Dept: PERINATAL CARE | Facility: HOSPITAL | Age: 24
Discharge: HOME | End: 2024-02-12
Attending: NURSE PRACTITIONER
Payer: COMMERCIAL

## 2024-02-12 DIAGNOSIS — Z36.3 ANTENATAL SCREENING FOR MALFORMATION USING ULTRASONICS: ICD-10-CM

## 2024-02-12 DIAGNOSIS — Z36.82 ENCOUNTER FOR NUCHAL TRANSLUCENCY TESTING: Primary | ICD-10-CM

## 2024-02-12 DIAGNOSIS — Z36.0 ENCOUNTER FOR ANTENATAL SCREENING FOR CHROMOSOMAL ANOMALIES: ICD-10-CM

## 2024-02-12 DIAGNOSIS — Z3A.12 12 WEEKS GESTATION OF PREGNANCY: ICD-10-CM

## 2024-02-12 PROCEDURE — 30099997 IG SPROC

## 2024-02-12 PROCEDURE — 76813 OB US NUCHAL MEAS 1 GEST: CPT

## 2024-03-18 ENCOUNTER — TRANSCRIBE ORDERS (OUTPATIENT)
Dept: SCHEDULING | Age: 24
End: 2024-03-18

## 2024-03-18 DIAGNOSIS — Z36.9 ENCOUNTER FOR ANTENATAL SCREENING OF MOTHER: Primary | ICD-10-CM

## 2024-03-18 DIAGNOSIS — O35.9XX0 MATERNAL CARE FOR (SUSPECTED) FETAL ABNORMALITY AND DAMAGE, UNSPECIFIED, NOT APPLICABLE OR UNSPECIFIED: ICD-10-CM

## 2024-04-11 ENCOUNTER — HOSPITAL ENCOUNTER (OUTPATIENT)
Dept: PERINATAL CARE | Facility: HOSPITAL | Age: 24
Discharge: HOME | End: 2024-04-11
Attending: NURSE PRACTITIONER
Payer: COMMERCIAL

## 2024-04-11 VITALS — WEIGHT: 130 LBS | BODY MASS INDEX: 23.04 KG/M2 | HEIGHT: 63 IN

## 2024-04-11 DIAGNOSIS — Z3A.21 21 WEEKS GESTATION OF PREGNANCY: ICD-10-CM

## 2024-04-11 DIAGNOSIS — Z36.3 ENCOUNTER FOR ROUTINE SCREENING FOR MALFORMATION USING ULTRASONICS: Primary | Chronic | ICD-10-CM

## 2024-04-11 PROCEDURE — 76805 OB US >/= 14 WKS SNGL FETUS: CPT

## 2024-07-25 LAB — GP B STREP SPEC QL CULT: NORMAL

## 2024-08-12 ENCOUNTER — HOSPITAL ENCOUNTER (OUTPATIENT)
Facility: HOSPITAL | Age: 24
Discharge: HOME | End: 2024-08-12
Attending: OBSTETRICS & GYNECOLOGY | Admitting: OBSTETRICS & GYNECOLOGY
Payer: COMMERCIAL

## 2024-08-12 VITALS
HEIGHT: 63 IN | SYSTOLIC BLOOD PRESSURE: 119 MMHG | DIASTOLIC BLOOD PRESSURE: 75 MMHG | OXYGEN SATURATION: 97 % | WEIGHT: 172 LBS | HEART RATE: 103 BPM | TEMPERATURE: 98.5 F | BODY MASS INDEX: 30.48 KG/M2 | RESPIRATION RATE: 20 BRPM

## 2024-08-12 PROCEDURE — 59025 FETAL NON-STRESS TEST: CPT

## 2024-08-12 NOTE — NURSING NOTE
Pt arrived to unit 38.5 weeks,  with c/o decreased fetal movement despite drinking juice and eating crackers. FHTs immediately reactive on arrival, pt reports feeling kicks coinciding with accelerations. Moderate variability and no decelerations noted. Occasional irregular contractions noted, mild to palpation, pt denies any abdominal pain. BP on arrival slightly elevated, 133/92 with . Pt reported feeling very anxious d/t not feeling baby move at home. Repeat /75 with . Per MD, ok for discharge to home.

## 2024-08-12 NOTE — DISCHARGE INSTRUCTIONS
Keep your next scheduled OB appointment for follow up. Call your provider with any leaking of fluid, vaginal bleeding, decreased fetal movement or contractions occurring every five minutes for at least one hour.

## 2024-08-17 ENCOUNTER — HOSPITAL ENCOUNTER (OUTPATIENT)
Facility: HOSPITAL | Age: 24
Discharge: HOME | End: 2024-08-17
Attending: OBSTETRICS & GYNECOLOGY | Admitting: OBSTETRICS & GYNECOLOGY
Payer: COMMERCIAL

## 2024-08-17 VITALS
HEART RATE: 115 BPM | RESPIRATION RATE: 16 BRPM | TEMPERATURE: 98.1 F | WEIGHT: 173 LBS | SYSTOLIC BLOOD PRESSURE: 123 MMHG | DIASTOLIC BLOOD PRESSURE: 77 MMHG | BODY MASS INDEX: 30.65 KG/M2 | OXYGEN SATURATION: 93 % | HEIGHT: 63 IN

## 2024-08-17 LAB — A1 MICROGLOB PLACENTAL VAG QL: NEGATIVE

## 2024-08-17 PROCEDURE — 84112 EVAL AMNIOTIC FLUID PROTEIN: CPT | Performed by: OBSTETRICS & GYNECOLOGY

## 2024-08-17 RX ORDER — ALBUTEROL SULFATE 90 UG/1
2 INHALANT RESPIRATORY (INHALATION) EVERY 6 HOURS PRN
COMMUNITY

## 2024-08-17 ASSESSMENT — COGNITIVE AND FUNCTIONAL STATUS - GENERAL: DO YOU HAVE SERIOUS DIFFICULTY WALKING OR CLIMBING STAIRS: NO

## 2024-08-18 ENCOUNTER — ANESTHESIA (INPATIENT)
Dept: OBSTETRICS AND GYNECOLOGY | Facility: HOSPITAL | Age: 24
End: 2024-08-18
Payer: COMMERCIAL

## 2024-08-18 ENCOUNTER — HOSPITAL ENCOUNTER (INPATIENT)
Facility: HOSPITAL | Age: 24
LOS: 1 days | Discharge: HOME | End: 2024-08-19
Attending: OBSTETRICS & GYNECOLOGY | Admitting: OBSTETRICS & GYNECOLOGY
Payer: COMMERCIAL

## 2024-08-18 ENCOUNTER — ANESTHESIA EVENT (INPATIENT)
Dept: OBSTETRICS AND GYNECOLOGY | Facility: HOSPITAL | Age: 24
End: 2024-08-18
Payer: COMMERCIAL

## 2024-08-18 LAB
ABO + RH BLD: NORMAL
BLD GP AB SCN SERPL QL: NEGATIVE
D AG BLD QL: POSITIVE
ERYTHROCYTE [DISTWIDTH] IN BLOOD BY AUTOMATED COUNT: 13.8 % (ref 11.7–14.4)
HBV SURFACE AG SER QL: NONREACTIVE
HCT VFR BLD AUTO: 36.2 % (ref 35–45)
HGB BLD-MCNC: 11.9 G/DL (ref 11.8–15.7)
LABORATORY COMMENT REPORT: NORMAL
MCH RBC QN AUTO: 30.1 PG (ref 28–33.2)
MCHC RBC AUTO-ENTMCNC: 32.9 G/DL (ref 32.2–35.5)
MCV RBC AUTO: 91.6 FL (ref 83–98)
PDW BLD AUTO: 10.6 FL (ref 9.4–12.3)
PLATELET # BLD AUTO: 240 K/UL (ref 150–369)
RBC # BLD AUTO: 3.95 M/UL (ref 3.93–5.22)
SPECIMEN EXP DATE BLD: NORMAL
T PALLIDUM AB SER QL IF: NONREACTIVE
WBC # BLD AUTO: 9.03 K/UL (ref 3.8–10.5)

## 2024-08-18 PROCEDURE — 63700000 HC SELF-ADMINISTRABLE DRUG: Performed by: OBSTETRICS & GYNECOLOGY

## 2024-08-18 PROCEDURE — 0KQM0ZZ REPAIR PERINEUM MUSCLE, OPEN APPROACH: ICD-10-PCS | Performed by: OBSTETRICS & GYNECOLOGY

## 2024-08-18 PROCEDURE — 25000000 HC PHARMACY GENERAL: Performed by: ANESTHESIOLOGY

## 2024-08-18 PROCEDURE — 25000000 HC PHARMACY GENERAL: Performed by: OBSTETRICS & GYNECOLOGY

## 2024-08-18 PROCEDURE — 86900 BLOOD TYPING SEROLOGIC ABO: CPT

## 2024-08-18 PROCEDURE — 63600000 HC DRUGS/DETAIL CODE: Mod: JW,JG | Performed by: ANESTHESIOLOGY

## 2024-08-18 PROCEDURE — 25000000 HC PHARMACY GENERAL

## 2024-08-18 PROCEDURE — 86780 TREPONEMA PALLIDUM: CPT | Performed by: OBSTETRICS & GYNECOLOGY

## 2024-08-18 PROCEDURE — 85027 COMPLETE CBC AUTOMATED: CPT | Performed by: OBSTETRICS & GYNECOLOGY

## 2024-08-18 PROCEDURE — 27200130 HC EPIDURAL ANES TRAY

## 2024-08-18 PROCEDURE — 37000005 HC ANESTHESIA EPIDURAL/SPINAL: Performed by: ANESTHESIOLOGY

## 2024-08-18 PROCEDURE — 36415 COLL VENOUS BLD VENIPUNCTURE: CPT | Performed by: OBSTETRICS & GYNECOLOGY

## 2024-08-18 PROCEDURE — 63600000 HC DRUGS/DETAIL CODE: Mod: JZ | Performed by: OBSTETRICS & GYNECOLOGY

## 2024-08-18 PROCEDURE — 12000000 HC ROOM AND CARE MED/SURG

## 2024-08-18 PROCEDURE — 72000011 HC VAGINAL DELIVERY LEVEL 1

## 2024-08-18 PROCEDURE — 87340 HEPATITIS B SURFACE AG IA: CPT | Performed by: OBSTETRICS & GYNECOLOGY

## 2024-08-18 RX ORDER — ALBUTEROL SULFATE 90 UG/1
2 INHALANT RESPIRATORY (INHALATION) EVERY 6 HOURS PRN
Status: DISCONTINUED | OUTPATIENT
Start: 2024-08-18 | End: 2024-08-18 | Stop reason: CLARIF

## 2024-08-18 RX ORDER — OXYTOCIN/0.9 % SODIUM CHLORIDE 30/500 ML
0-20 PLASTIC BAG, INJECTION (ML) INTRAVENOUS CONTINUOUS
Status: DISCONTINUED | OUTPATIENT
Start: 2024-08-18 | End: 2024-08-18

## 2024-08-18 RX ORDER — CARBOPROST TROMETHAMINE 250 UG/ML
250 INJECTION, SOLUTION INTRAMUSCULAR ONCE AS NEEDED
Status: DISCONTINUED | OUTPATIENT
Start: 2024-08-18 | End: 2024-08-18

## 2024-08-18 RX ORDER — DIPHENHYDRAMINE HCL 25 MG
25 CAPSULE ORAL EVERY 6 HOURS PRN
Status: DISCONTINUED | OUTPATIENT
Start: 2024-08-18 | End: 2024-08-19 | Stop reason: HOSPADM

## 2024-08-18 RX ORDER — FENTANYL/ROPIVACAINE/NS/PF 2-1500 MCG
PREFILLED PUMP RESERVOIR INJECTION CONTINUOUS
Status: DISCONTINUED | OUTPATIENT
Start: 2024-08-18 | End: 2024-08-18

## 2024-08-18 RX ORDER — OXYTOCIN 10 [USP'U]/ML
10 INJECTION, SOLUTION INTRAMUSCULAR; INTRAVENOUS ONCE AS NEEDED
Status: CANCELLED | OUTPATIENT
Start: 2024-08-18

## 2024-08-18 RX ORDER — LIDOCAINE HYDROCHLORIDE 10 MG/ML
0-30 INJECTION, SOLUTION EPIDURAL; INFILTRATION; INTRACAUDAL; PERINEURAL ONCE AS NEEDED
Status: CANCELLED | OUTPATIENT
Start: 2024-08-18

## 2024-08-18 RX ORDER — SODIUM CHLORIDE, SODIUM LACTATE, POTASSIUM CHLORIDE, CALCIUM CHLORIDE 600; 310; 30; 20 MG/100ML; MG/100ML; MG/100ML; MG/100ML
125 INJECTION, SOLUTION INTRAVENOUS CONTINUOUS
Status: DISCONTINUED | OUTPATIENT
Start: 2024-08-18 | End: 2024-08-18

## 2024-08-18 RX ORDER — TRANEXAMIC ACID 10 MG/ML
1000 INJECTION, SOLUTION INTRAVENOUS ONCE AS NEEDED
Status: CANCELLED | OUTPATIENT
Start: 2024-08-18

## 2024-08-18 RX ORDER — ONDANSETRON 4 MG/1
4 TABLET, ORALLY DISINTEGRATING ORAL EVERY 6 HOURS PRN
Status: DISCONTINUED | OUTPATIENT
Start: 2024-08-18 | End: 2024-08-18

## 2024-08-18 RX ORDER — OXYTOCIN/0.9 % SODIUM CHLORIDE 30/500 ML
PLASTIC BAG, INJECTION (ML) INTRAVENOUS
Status: COMPLETED
Start: 2024-08-18 | End: 2024-08-18

## 2024-08-18 RX ORDER — OXYTOCIN/0.9 % SODIUM CHLORIDE 30/500 ML
667 PLASTIC BAG, INJECTION (ML) INTRAVENOUS ONCE
Status: COMPLETED | OUTPATIENT
Start: 2024-08-18 | End: 2024-08-18

## 2024-08-18 RX ORDER — MISOPROSTOL 200 UG/1
1000 TABLET ORAL ONCE AS NEEDED
Status: CANCELLED | OUTPATIENT
Start: 2024-08-18

## 2024-08-18 RX ORDER — DIBUCAINE 1 %
1 OINTMENT (GRAM) TOPICAL AS NEEDED
Status: DISCONTINUED | OUTPATIENT
Start: 2024-08-18 | End: 2024-08-19 | Stop reason: HOSPADM

## 2024-08-18 RX ORDER — CETIRIZINE HYDROCHLORIDE 10 MG/1
10 TABLET ORAL DAILY
Status: DISCONTINUED | OUTPATIENT
Start: 2024-08-18 | End: 2024-08-19 | Stop reason: HOSPADM

## 2024-08-18 RX ORDER — ONDANSETRON HYDROCHLORIDE 2 MG/ML
4 INJECTION, SOLUTION INTRAVENOUS EVERY 6 HOURS PRN
Status: DISCONTINUED | OUTPATIENT
Start: 2024-08-18 | End: 2024-08-19 | Stop reason: HOSPADM

## 2024-08-18 RX ORDER — ALUMINUM HYDROXIDE, MAGNESIUM HYDROXIDE, AND SIMETHICONE 1200; 120; 1200 MG/30ML; MG/30ML; MG/30ML
30 SUSPENSION ORAL EVERY 4 HOURS PRN
Status: DISCONTINUED | OUTPATIENT
Start: 2024-08-18 | End: 2024-08-19 | Stop reason: HOSPADM

## 2024-08-18 RX ORDER — ONDANSETRON HYDROCHLORIDE 2 MG/ML
4 INJECTION, SOLUTION INTRAVENOUS EVERY 6 HOURS PRN
Status: DISCONTINUED | OUTPATIENT
Start: 2024-08-18 | End: 2024-08-18

## 2024-08-18 RX ORDER — ALBUTEROL SULFATE 0.83 MG/ML
2.5 SOLUTION RESPIRATORY (INHALATION) EVERY 6 HOURS PRN
Status: DISCONTINUED | OUTPATIENT
Start: 2024-08-18 | End: 2024-08-19 | Stop reason: HOSPADM

## 2024-08-18 RX ORDER — BUPIVACAINE HYDROCHLORIDE 2.5 MG/ML
INJECTION, SOLUTION EPIDURAL; INFILTRATION; INTRACAUDAL
Status: COMPLETED | OUTPATIENT
Start: 2024-08-18 | End: 2024-08-18

## 2024-08-18 RX ORDER — OXYCODONE HYDROCHLORIDE 5 MG/1
5 TABLET ORAL EVERY 4 HOURS PRN
Status: DISCONTINUED | OUTPATIENT
Start: 2024-08-18 | End: 2024-08-19 | Stop reason: HOSPADM

## 2024-08-18 RX ORDER — DIPHENHYDRAMINE HCL 50 MG/ML
25 VIAL (ML) INJECTION EVERY 6 HOURS PRN
Status: DISCONTINUED | OUTPATIENT
Start: 2024-08-18 | End: 2024-08-19 | Stop reason: HOSPADM

## 2024-08-18 RX ORDER — OXYTOCIN/0.9 % SODIUM CHLORIDE 30/500 ML
83 PLASTIC BAG, INJECTION (ML) INTRAVENOUS CONTINUOUS
Status: DISCONTINUED | OUTPATIENT
Start: 2024-08-18 | End: 2024-08-18

## 2024-08-18 RX ORDER — EPHEDRINE SULFATE/0.9% NACL/PF 50 MG/5 ML
10 SYRINGE (ML) INTRAVENOUS EVERY 10 MIN PRN
Status: DISCONTINUED | OUTPATIENT
Start: 2024-08-18 | End: 2024-08-18

## 2024-08-18 RX ORDER — CALCIUM CARBONATE 200(500)MG
200 TABLET,CHEWABLE ORAL EVERY 4 HOURS PRN
Status: DISCONTINUED | OUTPATIENT
Start: 2024-08-18 | End: 2024-08-19 | Stop reason: HOSPADM

## 2024-08-18 RX ORDER — IBUPROFEN 600 MG/1
600 TABLET ORAL EVERY 6 HOURS
Status: DISCONTINUED | OUTPATIENT
Start: 2024-08-18 | End: 2024-08-19 | Stop reason: HOSPADM

## 2024-08-18 RX ORDER — METHYLERGONOVINE MALEATE 0.2 MG/ML
0.2 INJECTION INTRAVENOUS ONCE AS NEEDED
Status: DISCONTINUED | OUTPATIENT
Start: 2024-08-18 | End: 2024-08-18

## 2024-08-18 RX ORDER — LIDOCAINE HYDROCHLORIDE AND EPINEPHRINE 15; 5 MG/ML; UG/ML
INJECTION, SOLUTION EPIDURAL
Status: COMPLETED | OUTPATIENT
Start: 2024-08-18 | End: 2024-08-18

## 2024-08-18 RX ORDER — ONDANSETRON 4 MG/1
4 TABLET, ORALLY DISINTEGRATING ORAL EVERY 6 HOURS PRN
Status: DISCONTINUED | OUTPATIENT
Start: 2024-08-18 | End: 2024-08-19 | Stop reason: HOSPADM

## 2024-08-18 RX ORDER — ACETAMINOPHEN 325 MG/1
650 TABLET ORAL EVERY 4 HOURS PRN
Status: DISCONTINUED | OUTPATIENT
Start: 2024-08-18 | End: 2024-08-19 | Stop reason: HOSPADM

## 2024-08-18 RX ORDER — AMOXICILLIN 250 MG
1 CAPSULE ORAL 2 TIMES DAILY
Status: DISCONTINUED | OUTPATIENT
Start: 2024-08-18 | End: 2024-08-19 | Stop reason: HOSPADM

## 2024-08-18 RX ADMIN — Medication 2 MILLI-UNITS/MIN: at 08:59

## 2024-08-18 RX ADMIN — IBUPROFEN 600 MG: 600 TABLET, FILM COATED ORAL at 10:46

## 2024-08-18 RX ADMIN — SODIUM CHLORIDE, POTASSIUM CHLORIDE, SODIUM LACTATE AND CALCIUM CHLORIDE 1000 ML: 600; 310; 30; 20 INJECTION, SOLUTION INTRAVENOUS at 07:03

## 2024-08-18 RX ADMIN — SENNOSIDES AND DOCUSATE SODIUM 1 TABLET: 8.6; 5 TABLET ORAL at 20:10

## 2024-08-18 RX ADMIN — LIDOCAINE HYDROCHLORIDE,EPINEPHRINE BITARTRATE 5 ML: 15; .005 INJECTION, SOLUTION EPIDURAL; INFILTRATION; INTRACAUDAL; PERINEURAL at 07:08

## 2024-08-18 RX ADMIN — ACETAMINOPHEN 650 MG: 325 TABLET, FILM COATED ORAL at 20:41

## 2024-08-18 RX ADMIN — CETIRIZINE HYDROCHLORIDE 10 MG: 10 TABLET, FILM COATED ORAL at 13:11

## 2024-08-18 RX ADMIN — ALBUTEROL SULFATE 2.5 MG: 2.5 SOLUTION RESPIRATORY (INHALATION) at 13:34

## 2024-08-18 RX ADMIN — IBUPROFEN 600 MG: 600 TABLET, FILM COATED ORAL at 22:33

## 2024-08-18 RX ADMIN — Medication 667 MILLI-UNITS/MIN: at 09:50

## 2024-08-18 RX ADMIN — Medication 50 ML: at 06:58

## 2024-08-18 RX ADMIN — IBUPROFEN 600 MG: 600 TABLET, FILM COATED ORAL at 17:04

## 2024-08-18 RX ADMIN — BUPIVACAINE HYDROCHLORIDE 5 ML: 2.5 INJECTION, SOLUTION EPIDURAL; INFILTRATION; INTRACAUDAL at 07:08

## 2024-08-18 RX ADMIN — DIPHENHYDRAMINE HYDROCHLORIDE 25 MG: 25 CAPSULE ORAL at 13:12

## 2024-08-18 NOTE — ANESTHESIA POSTPROCEDURE EVALUATION
Patient: Miryam Irby    Procedure Summary       Date: 08/18/24 Room / Location:     Anesthesia Start: 0707 Anesthesia Stop: 0946    Procedure: Labor Analgesia Diagnosis:     Scheduled Providers:  Responsible Provider: Sean Mabry MD    Anesthesia Type: labor epidural ASA Status: 2            Anesthesia Type: labor epidural  PACU Vitals    No data found in the last 10 encounters.           Anesthesia Post Evaluation    Pain management: adequate  Patient participation: complete - patient participated  Level of consciousness: awake and alert  Cardiovascular status: acceptable  Airway Patency: adequate  Respiratory status: acceptable  Hydration status: acceptable  Anesthetic complications: no

## 2024-08-18 NOTE — ANESTHESIA PREPROCEDURE EVALUATION
Anesthesia ROS/MED HX    Anesthesia History - neg  Pulmonary    asthma  Neuro/Psych - neg  Cardiovascular- neg  Hematological - neg  GI/Hepatic- neg  Musculoskeletal- neg  Renal Disease- neg  Endo/Other- neg      Allergies: No Known Allergies    ROS: Denies any chest pain or shortness of breath    PMH:   Past Medical History:   Diagnosis Date    Asthma     History of tonsillectomy        PSH:   Past Surgical History:   Procedure Laterality Date    NOSE SURGERY      SINUS SURGERY      TONSILLECTOMY & ADENOIDECTOMY      WISDOM TOOTH EXTRACTION         No current facility-administered medications on file prior to encounter.     Current Outpatient Medications on File Prior to Encounter   Medication Sig    albuterol HFA 90 mcg/actuation inhaler Inhale 2 puffs every 6 (six) hours as needed for wheezing.       CBC Results         12/19/23 04/30/22 09/22/21     1135 0940 1849    WBC 6.1 8.60 6.74    RBC 4.02 3.73 4.19    HGB 12.3 11.4 12.3    HCT 36.9 34.3 37.4    MCV 92 92.0 89.3    MCH 30.6 30.6 29.4    MCHC 33.3 33.2 32.9     232 266          BMP Results         12/19/23 09/22/21     1135 1849     134    K 4.9 3.4    Cl 102 104    CO2 18 18    Glucose 87 91    BUN 13 9    Creatinine 0.71 0.6    Calcium -- 9.8    Anion Gap -- 12    EGFR 122 >60.0           Comment for K at 1849 on 09/22/21: Results obtained on plasma. Plasma Potassium values may be up to 0.4 mEQ/L less than serum values. The differences may be greater for patients with high platelet or white cell counts.                    Physical Exam    Airway   Mallampati: I   TM distance: <3 FB   Neck ROM: full  Cardiovascular - normal   Rhythm: regular   Rate: normalPulmonary - normal   clear to auscultation  Other Findings   Back - neg    landmarks identified    Dental - normal        Anesthesia Plan    Plan: labor epidural    Technique: epidural      2 ASA  Anesthetic plan and risks discussed with: patient  Postop Plan:   Patient Disposition:  inpatient floor planned admission  Comments:    Plan: Epidural

## 2024-08-18 NOTE — L&D DELIVERY NOTE
OB Vaginal Delivery Note    Delivery:2024 at 9:46 AM   Patient:Miryam Irby  :2000    Review the Delivery Report for details.     Delivery Details    Pre-Op Diagnosis: 1. 24 y.o.  intrauterine pregnancy at 39w4d with knutson gestation.  2. Labor   Post-Op Diagnosis: 1. Term pregnancy delivered   Delivery Clinician: Lucie Davis    Delivery Assist;Delivery Nurse;Nursery Nurse  Dina Chandler;Mili Muhammad;Yesica Clancy    Delivery Type: Vaginal, Spontaneous    Labor Complications:     EBL: 200  mL   Anesthesia Type: Epidural    Placenta Delivery Spontaneous    Placenta Disposition: discarded    Additional Specimens None      INFANT INFORMATION  Time of Birth:9:46 AM   Presentation: Vertex   Position:Middle ,Occiput ,Anterior   Cord: 3 vessels ,Complications:Body Cord   [place] Sex: male   [place] Weight:       1 Minute 5 Minute 10 Minute   Apgar Totals: 9   9          Information for the patient's :  [place] [[id number]]      Cord Gas       None             Delivery Details:    Miryam Irby is a 24 y.o.  at 39w4d gestation who presented to the hospital for Normal labor [O80, Z37.9].  Her labor was spontaneous.  The patient progressed to fully dilated and pushed for  hours and   minutes.  A viable  male  infant was delivered by Vaginal, Spontaneous  from Middle ,Occiput ,Anterior .  The anterior and posterior shoulders delivered without difficulty followed by the remainder of the infant. A spontaneous cry was heard, and the infant appeared to be moving all 4 extremities. The cord was clamped and cut with 3 vessels  noted.  The placenta delivered spontaneously shortly thereafter.  Fundal massage was performed and the fundus was found to be firm, and lochia was within normal limits. The perineum, vagina, cervix were inspected, and the following lacerations were noted:      Episiotomy: None    Lacerations:   Perineal: 2nd  Repaired: Yes      Periurethral:    Repaired:     Labial:   Repaired:     Sulcus:   Repaired:     Vaginal:   Repaired:     Cervical:    Repaired:        Any lacerations were repaired in the usual fashion using Chromic  suture. Excellent hemostasis was noted. At the completion of the case, sponge and needle counts were correct. The infant and patient were left in the delivery room in stable condition.     Attending Attestation: I was present and scrubbed for the entire procedure.    Lucie Davis MD

## 2024-08-18 NOTE — ANESTHESIOLOGIST PRE-PROCEDURE ATTESTATION
Pre-Procedure Patient Identification:  I am the Primary Anesthesiologist and have identified the patient on 08/18/24 at 7:06 AM.   I have confirmed the procedure(s) will be performed by the following surgeon/proceduralist * Surgery not found *.

## 2024-08-18 NOTE — ANESTHESIA PROCEDURE NOTES
Epidural Block    Patient location during procedure: OB  Start time: 8/18/2024 7:07 AM  Reason for block: labor analgesia requested by patient and obstetrician  Staffing  Performed: anesthesiologist   Anesthesiologist: Segundo Mohan MD  Performed by: Segundo Mohan MD  Authorized by: Segundo Mohan MD    Preanesthetic Checklist  Completed: patient identified, surgical consent, pre-op evaluation, timeout performed, IV checked, risks and benefits discussed, monitors and equipment checked, anesthesia consent given and sterile field maintained during procedure  Needle  Needle type: Sow   Needle gauge: 17 G  Needle length: 3.5 in  Needle insertion depth: 4 cm  Catheter type: Single orifice  Catheter size: 19 G  Catheter at skin depth: 11 cm  Test dose: negative and lidocaine 1.5% with epinephrine 1-to-200,000  Assessment  Sensory level: T4  Additional Notes  After the patient was prepped and draped in the standard sterile fashion, an epidural needle was inserted into the midline lumbar spine until contacting ligamentum flavum. KATE to saline was at 5 cm and the epidural catheter threaded easily and secured at 11 cm at the skin. After a negative aspiration to both heme and CSF, a test dose of 5ml's 1.5% Lidocaine w/ 1:200K epinephrine was given via the epidural and it was negative. A loading dose of 5ml's 0.25% Bupivacaine was then given. Patient tolerated the procedure well and is now comfortable.        Medications Administered -   lidocaine 1.5%-EPINEPHrine 1:200,000 PF (XYLOCAINE W/EPI) injection - epidural   5 mL - 8/18/2024 7:08:00 AM  bupivacaine PF (MARCAINE) injection 0.25 % - epidural   5 mL - 8/18/2024 7:08:00 AM

## 2024-08-18 NOTE — H&P
HPI     Miryam Irby is a 24 y.o. female  at 39w4d with an estimated due date of 2024, by Last Menstrual Period who presents in labor.   Pregnancy has been uncomplicated.     OB History:   OB History    Para Term  AB Living   2 1 1 0 0 1   SAB IAB Ectopic Multiple Live Births   0 0 0 0 1      # Outcome Date GA Lbr Isreal/2nd Weight Sex Delivery Anes PTL Lv   2 Current            1 Term 22 39w0d 05:20 / 00:42 3.53 kg (7 lb 12.5 oz) M Vag-Spont EPI N MINA      Name: ALYSIA IRBY      Apgar1: 8  Apgar5: 9       Medical History:   Past Medical History:   Diagnosis Date    Asthma     History of tonsillectomy        Surgical History:   Past Surgical History:   Procedure Laterality Date    NOSE SURGERY      SINUS SURGERY      TONSILLECTOMY & ADENOIDECTOMY      WISDOM TOOTH EXTRACTION         Social History:   Social History     Socioeconomic History    Marital status: Single     Spouse name: None    Number of children: None    Years of education: None    Highest education level: None   Tobacco Use    Smoking status: Never    Smokeless tobacco: Never   Vaping Use    Vaping Use: Never used   Substance and Sexual Activity    Alcohol use: Not Currently    Drug use: Never    Sexual activity: Yes     Partners: Male     Social Determinants of Health     Food Insecurity: No Food Insecurity (2024)    Hunger Vital Sign     Worried About Running Out of Food in the Last Year: Never true     Ran Out of Food in the Last Year: Never true        Family History:   Family History   Problem Relation Age of Onset    Diabetes Paternal Grandfather     Diabetes Paternal Grandmother     Heart disease Maternal Grandfather     Asthma Biological Mother     Hypertension Biological Mother        Allergies: Patient has no known allergies.    Prior to Admission medications    Medication Sig Start Date End Date Taking? Authorizing Provider   albuterol HFA 90 mcg/actuation inhaler Inhale 2 puffs every 6 (six) hours  as needed for wheezing.   Yes Provider, paris Briones MD       Review of Systems  Pertinent items are noted in HPI.    Objective     Vital Signs for the last 24 hours:  Temp:  [36.7 °C (98 °F)-36.7 °C (98.1 °F)] 36.7 °C (98 °F)  Heart Rate:  [] 90  Resp:  [16] 16  BP: (123)/(77-80) 123/80    Latest cervical exam:  Cervical Dilation (cm): 6-7  Cervical Effacement:   Fetal Station: -2  Method: sterile exam per provider (24 0753)      Fetal Monitoring:  FHR Baseline: 150  FHR Variability: mod  FHR Accelerations: present  FHR Decelerations: absent    Contraction Frequency: Q5    Exam:  General Appearance: Alert, cooperative, no acute distress  Lungs: Clear to auscultation bilaterally, respirations unlabored  Heart: Regular rate and rhythm, S1 and S2 normal, no murmur, rub or gallop  Abdomen: gravid, nontender  Genitalia: See vaginal exam  Extremities: no edema or calf tenderness  Neurologic: grossly intact without focal deficits    AROM clear fluid    Labs:  ABO   Date Value Ref Range Status   2024 A  Final     Rh Factor   Date Value Ref Range Status   2024 Positive  Final     Rubella IgG Scr   Date Value Ref Range Status   10/04/2021 Immune  Final     Strep Gp B Cult/DNA Probe   Date Value Ref Range Status   2024 No Group B Streptococcus Isolated See table below, No growth at 18-24 hours, Probable contaminants, suggest recollection, No growth at 48 hours, No growth at 72 hours, No growth at 96 hours, No growth at 120 hours, No growth at 1 week, No growth at 2 weeks, No growth at 3 weeks, No gr... Final       Assessment/Plan     Miryam Irby is a 24 y.o. female  at 39w4d admitted for labor management. Epidural placed and working well.     FHR: Category I  GBS: negative    Lucie Davis MD

## 2024-08-19 ENCOUNTER — LACTATION ENCOUNTER (OUTPATIENT)
Dept: NURSERY | Facility: HOSPITAL | Age: 24
End: 2024-08-19

## 2024-08-19 VITALS
TEMPERATURE: 97.5 F | OXYGEN SATURATION: 97 % | SYSTOLIC BLOOD PRESSURE: 111 MMHG | RESPIRATION RATE: 18 BRPM | DIASTOLIC BLOOD PRESSURE: 63 MMHG | WEIGHT: 173 LBS | BODY MASS INDEX: 30.65 KG/M2 | HEART RATE: 86 BPM | HEIGHT: 63 IN

## 2024-08-19 PROCEDURE — 63700000 HC SELF-ADMINISTRABLE DRUG: Performed by: OBSTETRICS & GYNECOLOGY

## 2024-08-19 RX ORDER — IBUPROFEN 600 MG/1
600 TABLET ORAL EVERY 6 HOURS PRN
Qty: 100 TABLET | Refills: 1 | Status: SHIPPED | OUTPATIENT
Start: 2024-08-19

## 2024-08-19 RX ADMIN — PRENATAL VITAMINS-IRON FUMARATE 27 MG IRON-FOLIC ACID 0.8 MG TABLET 1 TABLET: at 08:02

## 2024-08-19 RX ADMIN — ACETAMINOPHEN 650 MG: 325 TABLET, FILM COATED ORAL at 01:12

## 2024-08-19 RX ADMIN — IBUPROFEN 600 MG: 600 TABLET, FILM COATED ORAL at 11:05

## 2024-08-19 RX ADMIN — CETIRIZINE HYDROCHLORIDE 10 MG: 10 TABLET, FILM COATED ORAL at 08:03

## 2024-08-19 RX ADMIN — IBUPROFEN 600 MG: 600 TABLET, FILM COATED ORAL at 05:11

## 2024-08-19 RX ADMIN — SENNOSIDES AND DOCUSATE SODIUM 1 TABLET: 8.6; 5 TABLET ORAL at 08:02

## 2024-08-19 RX ADMIN — ACETAMINOPHEN 650 MG: 325 TABLET, FILM COATED ORAL at 08:02

## 2024-08-19 NOTE — PLAN OF CARE
Problem: Adult Inpatient Plan of Care  Goal: Plan of Care Review  Outcome: Progressing  Flowsheets (Taken 8/19/2024 0111)  Progress: improving  Outcome Evaluation: VSS, pain well controlled with tylenol and motrin.  Bonding well with infant, breastfeeding independently.  Plan of Care Reviewed With: patient   Plan of Care Review  Plan of Care Reviewed With: patient  Progress: improving  Outcome Evaluation: VSS, pain well controlled with tylenol and motrin.  Bonding well with infant, breastfeeding independently.

## 2024-08-19 NOTE — DISCHARGE SUMMARY
Inpatient Discharge Summary    BRIEF OVERVIEW  Admitting Provider: Lucie Davis MD  Discharge Provider: Lucie Davis MD  Primary Care Physician at Discharge: Gigi Francois [phone]     Admission Date: 8/18/2024     Discharge Date: 8/19/2024    Primary Discharge Diagnosis  Normal labor    Secondary Discharge Diagnosis      Discharge Disposition  Home     Discharge Medications     Medication List        START taking these medications      ibuprofen 600 mg tablet  Commonly known as: MOTRIN  Take 1 tablet (600 mg total) by mouth every 6 (six) hours as needed for mild pain.  Dose: 600 mg            CONTINUE taking these medications      albuterol HFA 90 mcg/actuation inhaler  Inhale 2 puffs every 6 (six) hours as needed for wheezing.  Dose: 2 puff              Outpatient Follow-Up  Encounter Information    This patient does not currently have any appointments scheduled.         Test Results Pending at Discharge  Unresulted Labs (From admission, onward)      None            DETAILS OF HOSPITAL STAY    Presenting Problem/History of Present Illness  Normal labor [O80, Z37.9]      Hospital Course/Operative Procedures Performed  uncomplicated delivery and postpartum course, breast feeding baby boy, desires circ - will perform prior to dc home. Requesting dc home on PPD#1. F/u 6wk pp visit. Instructions/precautions provided.

## 2024-08-19 NOTE — LACTATION NOTE
This note was copied from a baby's chart.  P2 reports breastfeeding going well. Confident with latching baby and reports baby stays active at the breast.   Reviewed supply/ demand, feeding frequency, signs of adequate intake, and outpatient lactation resource list. Mom has a breast pump for home use. Plan to discharge today.

## 2024-08-19 NOTE — PROGRESS NOTES
"Obstetrics Postpartum Progress Note    Events  No acute events overnight.    Subjective  Pain: no  Bleeding: lochia minimal  Diet: taking regular diet  Voiding: without difficulty  Bowel: passing flatus  Ambulating: as tolerated    Vitals  Temp:  [36.4 °C (97.5 °F)-36.7 °C (98.1 °F)] 36.4 °C (97.5 °F)  Heart Rate:  [] 86  Resp:  [18-20] 18  BP: (102-122)/(54-76) 111/63    I&O    Intake/Output Summary (Last 24 hours) at 2024 0900  Last data filed at 2024 1400  Gross per 24 hour   Intake 30 ml   Output 800 ml   Net -770 ml       Physical Exam  General: well  Heart: Regular rate and rhythm  Lungs: Clear to auscultation bilaterally  Abdomen: soft, nondistended, non-tender  Fundus: firm and below umbilicus  Incision: not applicable, (vaginal delivery)  Perineum: deferred  Extremities: symmetric    Labs  Labs Reviewed:  Lab Results   Component Value Date    ABO A 2024    LABRH Positive 2024      A positive, antibody neg      Assessment/Plan   Problem-based Assessment and Plan    Miryam Irby is a 24 y.o.  postpartum day 1 s/p Vaginal, Spontaneous .    1. Vital Signs: stable  2. Hemodynamics: stable  3. Pain: controlled  4. VTE Assessment: Early Ambulation  5. Vaccinations/Rhogam: rhogam not indicated   6. Post care: meeting all goals  7. Breast feeding baby boy \"Jaguar\", desires circ, will perform today  8. Requests dc home today PPD1, stable from OB perspective if baby cleared by peds. Instructions and precautions provided in verbal and written format. 6wk pp visit.     Zully Mcbride MD      "

## 2025-01-14 ENCOUNTER — HOSPITAL ENCOUNTER (OUTPATIENT)
Facility: CLINIC | Age: 25
Discharge: HOME | End: 2025-01-14
Attending: EMERGENCY MEDICINE
Payer: COMMERCIAL

## 2025-01-14 VITALS
DIASTOLIC BLOOD PRESSURE: 80 MMHG | TEMPERATURE: 99.3 F | HEART RATE: 84 BPM | OXYGEN SATURATION: 98 % | SYSTOLIC BLOOD PRESSURE: 124 MMHG | RESPIRATION RATE: 18 BRPM

## 2025-01-14 DIAGNOSIS — J02.9 ACUTE PHARYNGITIS, UNSPECIFIED ETIOLOGY: Primary | ICD-10-CM

## 2025-01-14 LAB
EXPIRATION DATE: NORMAL
EXPIRATION DATE: NORMAL
HETEROPH AB SER QL LA: NEGATIVE
Lab: NORMAL
Lab: NORMAL
POCT MANUFACTURER: NORMAL
POCT MANUFACTURER: NORMAL
RAPID INFLUENZA A AGN: NEGATIVE
RAPID INFLUENZA B AGN: NEGATIVE
S PYO AG THROAT QL: NEGATIVE

## 2025-01-14 PROCEDURE — 87880 STREP A ASSAY W/OPTIC: CPT | Performed by: EMERGENCY MEDICINE

## 2025-01-14 PROCEDURE — 99213 OFFICE O/P EST LOW 20 MIN: CPT

## 2025-01-14 PROCEDURE — 86308 HETEROPHILE ANTIBODY SCREEN: CPT

## 2025-01-14 PROCEDURE — S9083 URGENT CARE CENTER GLOBAL: HCPCS | Performed by: EMERGENCY MEDICINE

## 2025-01-14 PROCEDURE — 87804 INFLUENZA ASSAY W/OPTIC: CPT | Performed by: EMERGENCY MEDICINE

## 2025-01-14 ASSESSMENT — ENCOUNTER SYMPTOMS
LIGHT-HEADEDNESS: 0
PHOTOPHOBIA: 0
CHILLS: 0
EYE REDNESS: 0
CONFUSION: 0
PALPITATIONS: 0
TROUBLE SWALLOWING: 0
NAUSEA: 0
SHORTNESS OF BREATH: 0
EYE PAIN: 0
MYALGIAS: 0
VOMITING: 0
WHEEZING: 0
DIZZINESS: 0
DYSURIA: 0
RHINORRHEA: 0
DIARRHEA: 0
SINUS PRESSURE: 0
SINUS PAIN: 0
ARTHRALGIAS: 0
COLOR CHANGE: 0
ABDOMINAL PAIN: 0
SORE THROAT: 1
ACTIVITY CHANGE: 0
COUGH: 1
FATIGUE: 0

## 2025-01-14 NOTE — ED ATTESTATION NOTE
I was immediately available to provide supervision and direction for the care of the patient.     Selvin Maldonado,   01/14/25 [id number]

## 2025-01-14 NOTE — ED PROVIDER NOTES
Emergency Medicine Note  HPI   HISTORY OF PRESENT ILLNESS     24 y.o. female presents for sore throat, congestion, cough for 3 days. States it feels like she is swallowing glass. She is able to swallow and manage secretions.  Has been using OTC medications for symptoms. Is s/p tonsillectomy. Does not think she has had mono in the past. States she was out of town in [place] and sharing drinks with other people while there. Has been using OTC medications for her symptoms so far.           Patient History   PAST HISTORY     Reviewed from Nursing Triage:       Past Medical History:   Diagnosis Date    Asthma     History of tonsillectomy        Past Surgical History   Procedure Laterality Date    Nose surgery      Sinus surgery      Tonsillectomy & adenoidectomy      [place] tooth extraction         Family History   Problem Relation Name Age of Onset    Diabetes Paternal Grandfather      Diabetes Paternal Grandmother      Heart disease Maternal Grandfather      Asthma Biological Mother      Hypertension Biological Mother         Social History     Tobacco Use    Smoking status: Never    Smokeless tobacco: Never   Vaping Use    Vaping status: Never Used   Substance Use Topics    Alcohol use: Not Currently    Drug use: Never         Review of Systems   REVIEW OF SYSTEMS     Review of Systems   Constitutional:  Negative for activity change, chills and fatigue.   HENT:  Positive for congestion, postnasal drip and sore throat. Negative for ear pain, hearing loss, rhinorrhea, sinus pressure, sinus pain and trouble swallowing.    Eyes:  Negative for photophobia, pain and redness.   Respiratory:  Positive for cough. Negative for shortness of breath and wheezing.    Cardiovascular:  Negative for chest pain and palpitations.   Gastrointestinal:  Negative for abdominal pain, diarrhea, nausea and vomiting.   Genitourinary:  Negative for dysuria.   Musculoskeletal:  Negative for arthralgias and myalgias.   Skin:  Negative for color  change.   Neurological:  Negative for dizziness and light-headedness.   Psychiatric/Behavioral:  Negative for confusion.          VITALS     ED Vitals      Date/Time Temp Pulse Resp BP SpO2 [facility]   01/14/25 0929 37.4 °C (99.3 °F) 84 18 124/80 98 % DS                         Physical Exam   PHYSICAL EXAM     Physical Exam  Vitals reviewed.   Constitutional:       Appearance: Normal appearance.   HENT:      Head: Normocephalic.      Right Ear: Tympanic membrane and ear canal normal.      Left Ear: Tympanic membrane and ear canal normal.      Nose: Nose normal.      Mouth/Throat:      Mouth: Mucous membranes are moist.   Eyes:      Extraocular Movements: Extraocular movements intact.      Pupils: Pupils are equal, round, and reactive to light.   Cardiovascular:      Rate and Rhythm: Normal rate and regular rhythm.      Heart sounds: Normal heart sounds.   Pulmonary:      Effort: Pulmonary effort is normal.   Abdominal:      General: Abdomen is flat.   Musculoskeletal:      Cervical back: Normal range of motion and neck supple.   Skin:     General: Skin is warm and dry.      Capillary Refill: Capillary refill takes less than 2 seconds.   Neurological:      Mental Status: She is alert and oriented to person, place, and time. Mental status is at baseline.   Psychiatric:         Behavior: Behavior normal.           PROCEDURES     Procedures     DATA     Results       None                No orders to display       Scoring tools                                  ED Course & MDM   MDM / ED COURSE / CLINICAL IMPRESSION / DISPO     Medical Decision Making  Rapid strep negative, flu negative, mono negative. Patient declined send out viral triple swab.  Recommend lozenges, salt water gargles, tea with honey, chloraseptic spray  Tylenol and/or NSAIDs as needed for discomfort and fever.  Follow up with PCP for persistent symptoms.   Patient verbalized understanding, in agreement with plan. All questions answered.           Clinical  Impression      Acute pharyngitis, unspecified etiology     _________________       ED Disposition   Discharge                       Felicia Mena CRNP  01/14/25 1211

## 2025-01-14 NOTE — DISCHARGE INSTRUCTIONS
Rapid strep, mono, and flu were negative  Use lozenges, salt water gargles, tea with honey, chloraseptic spray  Tylenol and/or NSAIDs as needed for discomfort and fever.  Follow up with PCP for persistent symptoms.

## 2025-08-25 ENCOUNTER — HOSPITAL ENCOUNTER (OUTPATIENT)
Dept: PERINATAL CARE | Facility: HOSPITAL | Age: 25
Discharge: HOME | End: 2025-08-25
Attending: NURSE PRACTITIONER
Payer: COMMERCIAL

## 2025-08-25 DIAGNOSIS — Z34.90 PREGNANCY, UNSPECIFIED GESTATIONAL AGE: ICD-10-CM

## 2025-08-25 PROCEDURE — 76801 OB US < 14 WKS SINGLE FETUS: CPT | Performed by: OBSTETRICS & GYNECOLOGY

## 2025-08-26 ENCOUNTER — ANESTHESIA EVENT (OUTPATIENT)
Dept: OPERATING ROOM | Facility: HOSPITAL | Age: 25
Setting detail: HOSPITAL OUTPATIENT SURGERY
End: 2025-08-26
Payer: COMMERCIAL

## 2025-08-26 ENCOUNTER — HOSPITAL ENCOUNTER (OUTPATIENT)
Facility: HOSPITAL | Age: 25
Setting detail: HOSPITAL OUTPATIENT SURGERY
Discharge: HOME | End: 2025-08-26
Attending: OBSTETRICS & GYNECOLOGY | Admitting: OBSTETRICS & GYNECOLOGY
Payer: COMMERCIAL

## 2025-08-26 ENCOUNTER — APPOINTMENT (OUTPATIENT)
Dept: RADIOLOGY | Facility: HOSPITAL | Age: 25
Setting detail: HOSPITAL OUTPATIENT SURGERY
End: 2025-08-26
Attending: OBSTETRICS & GYNECOLOGY
Payer: COMMERCIAL

## 2025-08-26 PROCEDURE — 25000000 HC PHARMACY GENERAL: Performed by: NURSE ANESTHETIST, CERTIFIED REGISTERED

## 2025-08-26 PROCEDURE — 63600000 HC DRUGS/DETAIL CODE: Mod: JW | Performed by: NURSE ANESTHETIST, CERTIFIED REGISTERED

## 2025-08-26 PROCEDURE — 25800000 HC PHARMACY IV SOLUTIONS: Performed by: NURSE ANESTHETIST, CERTIFIED REGISTERED

## 2025-08-26 RX ORDER — DEXMEDETOMIDINE HYDROCHLORIDE 4 UG/ML
INJECTION, SOLUTION INTRAVENOUS AS NEEDED
Status: DISCONTINUED | OUTPATIENT
Start: 2025-08-26 | End: 2025-08-26 | Stop reason: SURG

## 2025-08-26 RX ORDER — PROPOFOL 200MG/20ML
SYRINGE (ML) INTRAVENOUS AS NEEDED
Status: DISCONTINUED | OUTPATIENT
Start: 2025-08-26 | End: 2025-08-26 | Stop reason: SURG

## 2025-08-26 RX ORDER — EPHEDRINE SULFATE/0.9% NACL/PF 50 MG/5 ML
SYRINGE (ML) INTRAVENOUS AS NEEDED
Status: DISCONTINUED | OUTPATIENT
Start: 2025-08-26 | End: 2025-08-26 | Stop reason: SURG

## 2025-08-26 RX ORDER — SODIUM CHLORIDE 9 MG/ML
INJECTION, SOLUTION INTRAVENOUS CONTINUOUS PRN
Status: DISCONTINUED | OUTPATIENT
Start: 2025-08-26 | End: 2025-08-26 | Stop reason: SURG

## 2025-08-26 RX ORDER — FENTANYL CITRATE 50 UG/ML
INJECTION, SOLUTION INTRAMUSCULAR; INTRAVENOUS AS NEEDED
Status: DISCONTINUED | OUTPATIENT
Start: 2025-08-26 | End: 2025-08-26 | Stop reason: SURG

## 2025-08-26 RX ORDER — PROPOFOL 10 MG/ML
INJECTION, EMULSION INTRAVENOUS CONTINUOUS PRN
Status: DISCONTINUED | OUTPATIENT
Start: 2025-08-26 | End: 2025-08-26 | Stop reason: SURG

## 2025-08-26 RX ORDER — KETOROLAC TROMETHAMINE 30 MG/ML
INJECTION, SOLUTION INTRAMUSCULAR; INTRAVENOUS AS NEEDED
Status: DISCONTINUED | OUTPATIENT
Start: 2025-08-26 | End: 2025-08-26 | Stop reason: SURG

## 2025-08-26 RX ORDER — MIDAZOLAM HYDROCHLORIDE 2 MG/2ML
INJECTION, SOLUTION INTRAMUSCULAR; INTRAVENOUS AS NEEDED
Status: DISCONTINUED | OUTPATIENT
Start: 2025-08-26 | End: 2025-08-26 | Stop reason: SURG

## 2025-08-26 RX ORDER — DEXAMETHASONE SODIUM PHOSPHATE 4 MG/ML
INJECTION, SOLUTION INTRA-ARTICULAR; INTRALESIONAL; INTRAMUSCULAR; INTRAVENOUS; SOFT TISSUE AS NEEDED
Status: DISCONTINUED | OUTPATIENT
Start: 2025-08-26 | End: 2025-08-26 | Stop reason: SURG

## 2025-08-26 RX ORDER — ONDANSETRON HYDROCHLORIDE 2 MG/ML
INJECTION, SOLUTION INTRAVENOUS AS NEEDED
Status: DISCONTINUED | OUTPATIENT
Start: 2025-08-26 | End: 2025-08-26 | Stop reason: SURG

## 2025-08-26 RX ADMIN — FENTANYL CITRATE 25 MCG: 50 INJECTION, SOLUTION INTRAMUSCULAR; INTRAVENOUS at 12:20

## 2025-08-26 RX ADMIN — KETOROLAC TROMETHAMINE 30 MG: 30 INJECTION, SOLUTION INTRAMUSCULAR; INTRAVENOUS at 12:41

## 2025-08-26 RX ADMIN — DEXAMETHASONE SODIUM PHOSPHATE 4 MG: 4 INJECTION, SOLUTION INTRA-ARTICULAR; INTRALESIONAL; INTRAMUSCULAR; INTRAVENOUS; SOFT TISSUE at 12:31

## 2025-08-26 RX ADMIN — DEXMEDETOMIDINE HYDROCHLORIDE IN 0.9% SODIUM CHLORIDE 4 MCG: 4 INJECTION INTRAVENOUS at 12:35

## 2025-08-26 RX ADMIN — FENTANYL CITRATE 50 MCG: 50 INJECTION, SOLUTION INTRAMUSCULAR; INTRAVENOUS at 12:31

## 2025-08-26 RX ADMIN — PROPOFOL 125 MCG/KG/MIN: 10 INJECTION, EMULSION INTRAVENOUS at 12:19

## 2025-08-26 RX ADMIN — DEXMEDETOMIDINE HYDROCHLORIDE IN 0.9% SODIUM CHLORIDE 4 MCG: 4 INJECTION INTRAVENOUS at 12:22

## 2025-08-26 RX ADMIN — PROPOFOL 50 MG: 10 INJECTION, EMULSION INTRAVENOUS at 12:18

## 2025-08-26 RX ADMIN — ONDANSETRON 4 MG: 2 INJECTION INTRAMUSCULAR; INTRAVENOUS at 12:31

## 2025-08-26 RX ADMIN — Medication 10 MG: at 12:49

## 2025-08-26 RX ADMIN — MIDAZOLAM 2 MG: 1 INJECTION INTRAMUSCULAR; INTRAVENOUS at 12:15

## 2025-08-26 RX ADMIN — SODIUM CHLORIDE: 9 INJECTION, SOLUTION INTRAVENOUS at 12:15

## 2025-08-26 RX ADMIN — FENTANYL CITRATE 25 MCG: 50 INJECTION, SOLUTION INTRAMUSCULAR; INTRAVENOUS at 12:15

## 2025-09-01 ENCOUNTER — ANESTHESIA EVENT (EMERGENCY)
Dept: OPERATING ROOM | Facility: HOSPITAL | Age: 25
End: 2025-09-01
Payer: COMMERCIAL

## 2025-09-01 ENCOUNTER — HOSPITAL ENCOUNTER (EMERGENCY)
Facility: HOSPITAL | Age: 25
Discharge: HOME | End: 2025-09-01
Attending: EMERGENCY MEDICINE
Payer: COMMERCIAL

## 2025-09-01 ENCOUNTER — APPOINTMENT (EMERGENCY)
Dept: RADIOLOGY | Facility: HOSPITAL | Age: 25
End: 2025-09-01
Payer: COMMERCIAL

## 2025-09-01 ENCOUNTER — ANESTHESIA (EMERGENCY)
Dept: OPERATING ROOM | Facility: HOSPITAL | Age: 25
End: 2025-09-01
Payer: COMMERCIAL

## 2025-09-01 VITALS
SYSTOLIC BLOOD PRESSURE: 118 MMHG | HEART RATE: 68 BPM | BODY MASS INDEX: 26.58 KG/M2 | WEIGHT: 150 LBS | HEIGHT: 63 IN | RESPIRATION RATE: 14 BRPM | DIASTOLIC BLOOD PRESSURE: 72 MMHG | TEMPERATURE: 98 F | OXYGEN SATURATION: 98 %

## 2025-09-01 DIAGNOSIS — O03.4 RETAINED PRODUCTS OF CONCEPTION AFTER MISCARRIAGE: ICD-10-CM

## 2025-09-01 DIAGNOSIS — N93.9 VAGINA BLEEDING: Primary | ICD-10-CM

## 2025-09-01 LAB
ABO + RH BLD: NORMAL
ALBUMIN SERPL-MCNC: 4.1 G/DL (ref 3.5–5.7)
ALP SERPL-CCNC: 53 IU/L (ref 34–125)
ALT SERPL-CCNC: 20 IU/L (ref 7–52)
ANION GAP SERPL CALC-SCNC: 8 MEQ/L (ref 3–15)
AST SERPL-CCNC: 15 IU/L (ref 13–39)
BASOPHILS # BLD: 0.02 K/UL (ref 0.01–0.1)
BASOPHILS NFR BLD: 0.3 %
BILIRUB SERPL-MCNC: 0.4 MG/DL (ref 0.3–1.2)
BLD GP AB SCN SERPL QL: NEGATIVE
BUN SERPL-MCNC: 13 MG/DL (ref 7–25)
CALCIUM SERPL-MCNC: 9.2 MG/DL (ref 8.6–10.3)
CHLORIDE SERPL-SCNC: 105 MEQ/L (ref 98–107)
CO2 SERPL-SCNC: 24 MEQ/L (ref 21–31)
CREAT SERPL-MCNC: 0.6 MG/DL (ref 0.6–1.2)
D AG BLD QL: POSITIVE
DIFFERENTIAL METHOD BLD: ABNORMAL
EGFRCR SERPLBLD CKD-EPI 2021: >60 ML/MIN/1.73M*2
EOSINOPHIL # BLD: 0.25 K/UL (ref 0.04–0.36)
EOSINOPHIL NFR BLD: 3.8 %
ERYTHROCYTE [DISTWIDTH] IN BLOOD BY AUTOMATED COUNT: 13.8 % (ref 11.7–14.4)
GLUCOSE SERPL-MCNC: 95 MG/DL (ref 70–99)
HCG SERPL-ACNC: 912.3 IU/L (MIU/ML)
HCG UR QL: POSITIVE
HCT VFR BLD AUTO: 29.7 % (ref 35–45)
HGB BLD-MCNC: 9.6 G/DL (ref 11.8–15.7)
IMM GRANULOCYTES # BLD AUTO: 0.03 K/UL (ref 0–0.08)
IMM GRANULOCYTES NFR BLD AUTO: 0.5 %
LABORATORY COMMENT REPORT: NORMAL
LYMPHOCYTES # BLD: 1.37 K/UL (ref 1.2–3.5)
LYMPHOCYTES NFR BLD: 20.9 %
MCH RBC QN AUTO: 29.8 PG (ref 28–33.2)
MCHC RBC AUTO-ENTMCNC: 32.3 G/DL (ref 32.2–35.5)
MCV RBC AUTO: 92.2 FL (ref 83–98)
MONOCYTES # BLD: 0.24 K/UL (ref 0.28–0.8)
MONOCYTES NFR BLD: 3.7 %
NEUTROPHILS # BLD: 4.65 K/UL (ref 1.7–7)
NEUTS SEG NFR BLD: 70.8 %
NRBC BLD-RTO: 0 %
PLATELET # BLD AUTO: 322 K/UL (ref 150–369)
PMV BLD AUTO: 9.4 FL (ref 9.4–12.3)
POTASSIUM SERPL-SCNC: 4.1 MEQ/L (ref 3.5–5.1)
PROT SERPL-MCNC: 7.1 G/DL (ref 6–8.2)
RBC # BLD AUTO: 3.22 M/UL (ref 3.93–5.22)
SODIUM SERPL-SCNC: 137 MEQ/L (ref 136–145)
SPECIMEN EXP DATE BLD: NORMAL
WBC # BLD AUTO: 6.56 K/UL (ref 3.8–10.5)

## 2025-09-01 PROCEDURE — 71000001 HC PACU PHASE 1 INITIAL 30MIN: Performed by: OBSTETRICS & GYNECOLOGY

## 2025-09-01 PROCEDURE — 25000000 HC PHARMACY GENERAL: Performed by: OBSTETRICS & GYNECOLOGY

## 2025-09-01 PROCEDURE — 63600000 HC DRUGS/DETAIL CODE: Mod: JZ | Performed by: PHYSICIAN ASSISTANT

## 2025-09-01 PROCEDURE — 86850 RBC ANTIBODY SCREEN: CPT

## 2025-09-01 PROCEDURE — 63600000 HC DRUGS/DETAIL CODE: Mod: JZ | Performed by: OBSTETRICS & GYNECOLOGY

## 2025-09-01 PROCEDURE — 63600000 HC DRUGS/DETAIL CODE: Mod: JZ | Performed by: NURSE ANESTHETIST, CERTIFIED REGISTERED

## 2025-09-01 PROCEDURE — 27200000 HC STERILE SUPPLY: Performed by: OBSTETRICS & GYNECOLOGY

## 2025-09-01 PROCEDURE — 84703 CHORIONIC GONADOTROPIN ASSAY: CPT | Performed by: EMERGENCY MEDICINE

## 2025-09-01 PROCEDURE — 80053 COMPREHEN METABOLIC PANEL: CPT | Performed by: EMERGENCY MEDICINE

## 2025-09-01 PROCEDURE — 76830 TRANSVAGINAL US NON-OB: CPT

## 2025-09-01 PROCEDURE — 84702 CHORIONIC GONADOTROPIN TEST: CPT | Performed by: PHYSICIAN ASSISTANT

## 2025-09-01 PROCEDURE — 36415 COLL VENOUS BLD VENIPUNCTURE: CPT | Performed by: EMERGENCY MEDICINE

## 2025-09-01 PROCEDURE — 88305 TISSUE EXAM BY PATHOLOGIST: CPT | Performed by: OBSTETRICS & GYNECOLOGY

## 2025-09-01 PROCEDURE — 93975 VASCULAR STUDY: CPT

## 2025-09-01 PROCEDURE — 71000002 HC PACU PHASE 2 INITIAL 30MIN: Performed by: OBSTETRICS & GYNECOLOGY

## 2025-09-01 PROCEDURE — 36000002 HC OR LEVEL 2 INITIAL 30MIN: Performed by: OBSTETRICS & GYNECOLOGY

## 2025-09-01 PROCEDURE — 25000000 HC PHARMACY GENERAL: Performed by: NURSE ANESTHETIST, CERTIFIED REGISTERED

## 2025-09-01 PROCEDURE — 25800000 HC PHARMACY IV SOLUTIONS: Performed by: NURSE ANESTHETIST, CERTIFIED REGISTERED

## 2025-09-01 PROCEDURE — 63600000 HC DRUGS/DETAIL CODE: Mod: JZ | Performed by: STUDENT IN AN ORGANIZED HEALTH CARE EDUCATION/TRAINING PROGRAM

## 2025-09-01 PROCEDURE — 37000002 HC ANESTHESIA MAC: Performed by: OBSTETRICS & GYNECOLOGY

## 2025-09-01 PROCEDURE — 71000011 HC PACU PHASE 1 EA ADDL MIN: Performed by: OBSTETRICS & GYNECOLOGY

## 2025-09-01 PROCEDURE — 76857 US EXAM PELVIC LIMITED: CPT

## 2025-09-01 PROCEDURE — 85025 COMPLETE CBC W/AUTO DIFF WBC: CPT | Performed by: EMERGENCY MEDICINE

## 2025-09-01 RX ORDER — DEXAMETHASONE SODIUM PHOSPHATE 4 MG/ML
INJECTION, SOLUTION INTRA-ARTICULAR; INTRALESIONAL; INTRAMUSCULAR; INTRAVENOUS; SOFT TISSUE AS NEEDED
Status: DISCONTINUED | OUTPATIENT
Start: 2025-09-01 | End: 2025-09-01 | Stop reason: SURG

## 2025-09-01 RX ORDER — LIDOCAINE HYDROCHLORIDE 10 MG/ML
INJECTION, SOLUTION INFILTRATION; PERINEURAL
Status: DISCONTINUED | OUTPATIENT
Start: 2025-09-01 | End: 2025-09-01 | Stop reason: HOSPADM

## 2025-09-01 RX ORDER — LIDOCAINE HYDROCHLORIDE 10 MG/ML
INJECTION, SOLUTION EPIDURAL; INFILTRATION; INTRACAUDAL; PERINEURAL AS NEEDED
Status: DISCONTINUED | OUTPATIENT
Start: 2025-09-01 | End: 2025-09-01 | Stop reason: SURG

## 2025-09-01 RX ORDER — KETOROLAC TROMETHAMINE 15 MG/ML
INJECTION, SOLUTION INTRAMUSCULAR; INTRAVENOUS AS NEEDED
Status: DISCONTINUED | OUTPATIENT
Start: 2025-09-01 | End: 2025-09-01 | Stop reason: SURG

## 2025-09-01 RX ORDER — SODIUM CHLORIDE 9 MG/ML
INJECTION, SOLUTION INTRAVENOUS CONTINUOUS PRN
Status: DISCONTINUED | OUTPATIENT
Start: 2025-09-01 | End: 2025-09-01 | Stop reason: SURG

## 2025-09-01 RX ORDER — KETOROLAC TROMETHAMINE 15 MG/ML
15 INJECTION, SOLUTION INTRAMUSCULAR; INTRAVENOUS ONCE
Status: DISCONTINUED | OUTPATIENT
Start: 2025-09-01 | End: 2025-09-01 | Stop reason: HOSPADM

## 2025-09-01 RX ORDER — MIDAZOLAM HYDROCHLORIDE 2 MG/2ML
INJECTION, SOLUTION INTRAMUSCULAR; INTRAVENOUS AS NEEDED
Status: DISCONTINUED | OUTPATIENT
Start: 2025-09-01 | End: 2025-09-01 | Stop reason: SURG

## 2025-09-01 RX ORDER — KETOROLAC TROMETHAMINE 15 MG/ML
15 INJECTION, SOLUTION INTRAMUSCULAR; INTRAVENOUS ONCE
Status: COMPLETED | OUTPATIENT
Start: 2025-09-01 | End: 2025-09-01

## 2025-09-01 RX ORDER — ONDANSETRON HYDROCHLORIDE 2 MG/ML
INJECTION, SOLUTION INTRAVENOUS AS NEEDED
Status: DISCONTINUED | OUTPATIENT
Start: 2025-09-01 | End: 2025-09-01 | Stop reason: SURG

## 2025-09-01 RX ORDER — SODIUM CHLORIDE, SODIUM LACTATE, POTASSIUM CHLORIDE, CALCIUM CHLORIDE 600; 310; 30; 20 MG/100ML; MG/100ML; MG/100ML; MG/100ML
INJECTION, SOLUTION INTRAVENOUS CONTINUOUS
Status: DISCONTINUED | OUTPATIENT
Start: 2025-09-01 | End: 2025-09-01 | Stop reason: HOSPADM

## 2025-09-01 RX ORDER — ONDANSETRON HYDROCHLORIDE 2 MG/ML
4 INJECTION, SOLUTION INTRAVENOUS
Status: DISCONTINUED | OUTPATIENT
Start: 2025-09-01 | End: 2025-09-01 | Stop reason: HOSPADM

## 2025-09-01 RX ORDER — HYDROMORPHONE HYDROCHLORIDE 1 MG/ML
2 INJECTION, SOLUTION INTRAMUSCULAR; INTRAVENOUS; SUBCUTANEOUS EVERY 4 HOURS PRN
Status: COMPLETED | OUTPATIENT
Start: 2025-09-01 | End: 2025-09-01

## 2025-09-01 RX ORDER — HYDROMORPHONE HYDROCHLORIDE 1 MG/ML
0.5 INJECTION, SOLUTION INTRAMUSCULAR; INTRAVENOUS; SUBCUTANEOUS
Status: DISCONTINUED | OUTPATIENT
Start: 2025-09-01 | End: 2025-09-01 | Stop reason: HOSPADM

## 2025-09-01 RX ORDER — IBUPROFEN 600 MG/1
600 TABLET, FILM COATED ORAL EVERY 6 HOURS PRN
Status: DISCONTINUED | OUTPATIENT
Start: 2025-09-01 | End: 2025-09-01 | Stop reason: HOSPADM

## 2025-09-01 RX ORDER — PROPOFOL 10 MG/ML
INJECTION, EMULSION INTRAVENOUS CONTINUOUS PRN
Status: DISCONTINUED | OUTPATIENT
Start: 2025-09-01 | End: 2025-09-01 | Stop reason: SURG

## 2025-09-01 RX ORDER — DIAZEPAM 10 MG/2ML
2 INJECTION INTRAMUSCULAR ONCE
Status: COMPLETED | OUTPATIENT
Start: 2025-09-01 | End: 2025-09-01

## 2025-09-01 RX ORDER — FENTANYL CITRATE 50 UG/ML
50 INJECTION, SOLUTION INTRAMUSCULAR; INTRAVENOUS EVERY 5 MIN PRN
Status: COMPLETED | OUTPATIENT
Start: 2025-09-01 | End: 2025-09-01

## 2025-09-01 RX ORDER — FENTANYL CITRATE 50 UG/ML
INJECTION, SOLUTION INTRAMUSCULAR; INTRAVENOUS AS NEEDED
Status: DISCONTINUED | OUTPATIENT
Start: 2025-09-01 | End: 2025-09-01 | Stop reason: SURG

## 2025-09-01 RX ADMIN — KETOROLAC TROMETHAMINE 15 MG: 15 INJECTION, SOLUTION INTRAMUSCULAR; INTRAVENOUS at 09:54

## 2025-09-01 RX ADMIN — HYDROMORPHONE HYDROCHLORIDE 2 MG: 1 INJECTION, SOLUTION INTRAMUSCULAR; INTRAVENOUS; SUBCUTANEOUS at 13:33

## 2025-09-01 RX ADMIN — PROPOFOL 50 MG: 10 INJECTION, EMULSION INTRAVENOUS at 17:19

## 2025-09-01 RX ADMIN — PROPOFOL 150 MCG/KG/MIN: 10 INJECTION, EMULSION INTRAVENOUS at 17:18

## 2025-09-01 RX ADMIN — LIDOCAINE HYDROCHLORIDE 5 ML: 10 INJECTION, SOLUTION EPIDURAL; INFILTRATION; INTRACAUDAL at 17:19

## 2025-09-01 RX ADMIN — FENTANYL CITRATE 50 MCG: 50 INJECTION INTRAMUSCULAR; INTRAVENOUS at 19:24

## 2025-09-01 RX ADMIN — ONDANSETRON 4 MG: 2 INJECTION INTRAMUSCULAR; INTRAVENOUS at 17:22

## 2025-09-01 RX ADMIN — DEXAMETHASONE SODIUM PHOSPHATE 4 MG: 4 INJECTION, SOLUTION INTRA-ARTICULAR; INTRALESIONAL; INTRAMUSCULAR; INTRAVENOUS; SOFT TISSUE at 17:22

## 2025-09-01 RX ADMIN — SODIUM CHLORIDE: 9 INJECTION, SOLUTION INTRAVENOUS at 17:14

## 2025-09-01 RX ADMIN — FENTANYL CITRATE 50 MCG: 50 INJECTION INTRAMUSCULAR; INTRAVENOUS at 17:21

## 2025-09-01 RX ADMIN — FENTANYL CITRATE 50 MCG: 50 INJECTION INTRAMUSCULAR; INTRAVENOUS at 19:32

## 2025-09-01 RX ADMIN — KETOROLAC TROMETHAMINE 15 MG: 15 INJECTION, SOLUTION INTRAMUSCULAR; INTRAVENOUS at 17:31

## 2025-09-01 RX ADMIN — DOXYCYCLINE 100 MG: 100 INJECTION, POWDER, LYOPHILIZED, FOR SOLUTION INTRAVENOUS at 17:45

## 2025-09-01 RX ADMIN — FENTANYL CITRATE 50 MCG: 50 INJECTION INTRAMUSCULAR; INTRAVENOUS at 17:14

## 2025-09-01 RX ADMIN — DIAZEPAM 2 MG: 10 INJECTION, SOLUTION INTRAMUSCULAR; INTRAVENOUS at 11:58

## 2025-09-01 RX ADMIN — MIDAZOLAM 2 MG: 1 INJECTION INTRAMUSCULAR; INTRAVENOUS at 17:14

## 2025-09-03 DIAGNOSIS — N93.9 ABNORMAL VAGINAL BLEEDING: ICD-10-CM

## 2025-09-03 DIAGNOSIS — Q27.39 ARTERIOVENOUS MALFORMATION OF UTERUS: Primary | ICD-10-CM

## 2025-09-03 LAB
CASE RPRT: NORMAL
CLINICAL INFO: NORMAL
PATH REPORT.FINAL DX SPEC: NORMAL
PATH REPORT.GROSS SPEC: NORMAL

## (undated) DEVICE — SYRINGE 10CC CONTROL LL

## (undated) DEVICE — GAUZE 4X4 16 PLY RFID DOUBLE XRAY

## (undated) DEVICE — UNDERPAD DURASORB 30 X 30

## (undated) DEVICE — Device

## (undated) DEVICE — PAD NONADHESIVE W3XL8IN POLYESTER COTTON ABSORBENT CURAD

## (undated) DEVICE — SLEEVE SCD COMFORT KNEE LENGTH MED

## (undated) DEVICE — COVER MAYO STAND XLARGE 30¿ X 57¿

## (undated) DEVICE — TOWEL SURGICAL W17XL27IN BLUE COTTON STANDARD PREWASHED DELI

## (undated) DEVICE — STRAP POSITIONING 5X72" ONE PIECE DISP

## (undated) DEVICE — TUBING DE SMALL FOR 8-14MM VACURETTE

## (undated) DEVICE — NEEDLE DISP SPINAL 22GX3-1/2IN

## (undated) DEVICE — GLOVE SZ 7.5 PROTEXIS PI

## (undated) DEVICE — MANIFOLD FOUR PORT NEPTUNE

## (undated) DEVICE — GOWN SIRUS NONRNF RAGLAN L ST 32/CS

## (undated) DEVICE — HANDLE LIGHT COVER STERILE

## (undated) DEVICE — PAD MATERNITY

## (undated) DEVICE — DRAPE POUCH IRRIGATION

## (undated) DEVICE — SOLN BETADINE 4 OZ

## (undated) DEVICE — GOWN SIRUS NONRNF RAGLAN XL ST 30/CS

## (undated) DEVICE — CANISTER DE BERKELEY W/TISSUETRAP